# Patient Record
Sex: MALE | Race: WHITE | NOT HISPANIC OR LATINO | Employment: UNEMPLOYED | URBAN - NONMETROPOLITAN AREA
[De-identification: names, ages, dates, MRNs, and addresses within clinical notes are randomized per-mention and may not be internally consistent; named-entity substitution may affect disease eponyms.]

---

## 2017-02-10 ENCOUNTER — OFFICE VISIT (OUTPATIENT)
Dept: FAMILY MEDICINE CLINIC | Facility: CLINIC | Age: 57
End: 2017-02-10

## 2017-02-10 VITALS
DIASTOLIC BLOOD PRESSURE: 82 MMHG | BODY MASS INDEX: 28.99 KG/M2 | HEART RATE: 82 BPM | WEIGHT: 174 LBS | OXYGEN SATURATION: 94 % | SYSTOLIC BLOOD PRESSURE: 153 MMHG | HEIGHT: 65 IN

## 2017-02-10 DIAGNOSIS — R03.0 ELEVATED BLOOD PRESSURE READING WITHOUT DIAGNOSIS OF HYPERTENSION: ICD-10-CM

## 2017-02-10 DIAGNOSIS — B20 HIV (HUMAN IMMUNODEFICIENCY VIRUS INFECTION) (HCC): Primary | ICD-10-CM

## 2017-02-10 DIAGNOSIS — N28.89 RENAL MASS: ICD-10-CM

## 2017-02-10 DIAGNOSIS — B18.1 CHRONIC VIRAL HEPATITIS B WITHOUT DELTA AGENT AND WITHOUT COMA (HCC): ICD-10-CM

## 2017-02-10 DIAGNOSIS — R39.12 POOR URINARY STREAM: ICD-10-CM

## 2017-02-10 DIAGNOSIS — K76.9 LIVER DISEASE: ICD-10-CM

## 2017-02-10 DIAGNOSIS — G04.90 ENCEPHALITIS: ICD-10-CM

## 2017-02-10 PROBLEM — B19.10 HEPATITIS B: Status: ACTIVE | Noted: 2017-02-10

## 2017-02-10 PROCEDURE — 99204 OFFICE O/P NEW MOD 45 MIN: CPT | Performed by: FAMILY MEDICINE

## 2017-02-13 LAB
ALBUMIN SERPL-MCNC: 4.6 G/DL (ref 3.5–5)
ALBUMIN/GLOB SERPL: 1.4 G/DL (ref 1.5–2.5)
ALP SERPL-CCNC: 78 U/L (ref 46–116)
ALT SERPL W P-5'-P-CCNC: 20 U/L (ref 10–44)
ANION GAP SERPL CALCULATED.3IONS-SCNC: 1.9 MMOL/L (ref 3.6–11.2)
AST SERPL-CCNC: 22 U/L (ref 10–34)
BASOPHILS # BLD AUTO: 0.02 10*3/MM3 (ref 0–0.3)
BASOPHILS NFR BLD AUTO: 0.2 % (ref 0–2)
BILIRUB SERPL-MCNC: 0.6 MG/DL (ref 0.2–1.8)
BUN BLD-MCNC: 14 MG/DL (ref 7–21)
BUN/CREAT SERPL: 14.7 (ref 7–25)
CALCIUM SPEC-SCNC: 9.9 MG/DL (ref 7.7–10)
CHLORIDE SERPL-SCNC: 109 MMOL/L (ref 99–112)
CO2 SERPL-SCNC: 34.1 MMOL/L (ref 24.3–31.9)
CREAT BLD-MCNC: 0.95 MG/DL (ref 0.43–1.29)
DEPRECATED RDW RBC AUTO: 50 FL (ref 37–54)
EOSINOPHIL # BLD AUTO: 0.23 10*3/MM3 (ref 0–0.7)
EOSINOPHIL NFR BLD AUTO: 2.9 % (ref 0–5)
ERYTHROCYTE [DISTWIDTH] IN BLOOD BY AUTOMATED COUNT: 13.2 % (ref 11.5–14.5)
GFR SERPL CREATININE-BSD FRML MDRD: 83 ML/MIN/1.73
GLOBULIN UR ELPH-MCNC: 3.3 GM/DL
GLUCOSE BLD-MCNC: 91 MG/DL (ref 70–110)
HCT VFR BLD AUTO: 48.2 % (ref 42–52)
HGB BLD-MCNC: 16.2 G/DL (ref 14–18)
IMM GRANULOCYTES # BLD: 0.01 10*3/MM3 (ref 0–0.03)
IMM GRANULOCYTES NFR BLD: 0.1 % (ref 0–0.5)
LYMPHOCYTES # BLD AUTO: 1.65 10*3/MM3 (ref 1–3)
LYMPHOCYTES NFR BLD AUTO: 20.5 % (ref 21–51)
MCH RBC QN AUTO: 34.5 PG (ref 27–33)
MCHC RBC AUTO-ENTMCNC: 33.6 G/DL (ref 33–37)
MCV RBC AUTO: 102.8 FL (ref 80–94)
MONOCYTES # BLD AUTO: 0.69 10*3/MM3 (ref 0.1–0.9)
MONOCYTES NFR BLD AUTO: 8.6 % (ref 0–10)
NEUTROPHILS # BLD AUTO: 5.44 10*3/MM3 (ref 1.4–6.5)
NEUTROPHILS NFR BLD AUTO: 67.7 % (ref 30–70)
OSMOLALITY SERPL CALC.SUM OF ELEC: 288.8 MOSM/KG (ref 273–305)
PLATELET # BLD AUTO: 164 10*3/MM3 (ref 130–400)
PMV BLD AUTO: 9.8 FL (ref 6–10)
POTASSIUM BLD-SCNC: 4 MMOL/L (ref 3.5–5.3)
PROT SERPL-MCNC: 7.9 G/DL (ref 6–8)
PSA SERPL-MCNC: 0.45 NG/ML (ref 0–4)
RBC # BLD AUTO: 4.69 10*6/MM3 (ref 4.7–6.1)
SODIUM BLD-SCNC: 145 MMOL/L (ref 135–153)
WBC NRBC COR # BLD: 8.04 10*3/MM3 (ref 4.5–12.5)

## 2017-02-13 PROCEDURE — 84153 ASSAY OF PSA TOTAL: CPT | Performed by: FAMILY MEDICINE

## 2017-02-13 PROCEDURE — 85025 COMPLETE CBC W/AUTO DIFF WBC: CPT | Performed by: FAMILY MEDICINE

## 2017-02-13 PROCEDURE — 80053 COMPREHEN METABOLIC PANEL: CPT | Performed by: FAMILY MEDICINE

## 2017-02-13 PROCEDURE — 80061 LIPID PANEL: CPT | Performed by: FAMILY MEDICINE

## 2017-02-13 PROCEDURE — 36415 COLL VENOUS BLD VENIPUNCTURE: CPT | Performed by: FAMILY MEDICINE

## 2017-02-13 PROCEDURE — 83704 LIPOPROTEIN BLD QUAN PART: CPT | Performed by: FAMILY MEDICINE

## 2017-02-15 LAB
CHOLEST SERPL-MCNC: 184 MG/DL (ref 100–199)
HDL SERPL-SCNC: 28.5 UMOL/L
HDLC SERPL-MCNC: 38 MG/DL
LDL-P: 1808 NMOL/L
LDLC REAL SIZE PAT SERPL: 20.9 NM
LDLC SERPL CALC-MCNC: 128 MG/DL (ref 0–99)
SMALL LDL-P: 917 NMOL/L
TRIGL SERPL-MCNC: 89 MG/DL (ref 0–149)

## 2017-02-20 PROBLEM — G04.90 ENCEPHALITIS: Status: ACTIVE | Noted: 2017-02-20

## 2017-02-21 NOTE — PROGRESS NOTES
"Noe Feliciano     VITALS: Blood pressure 153/82, pulse 82, height 65\" (165.1 cm), weight 174 lb (78.9 kg), SpO2 94 %.    Subjective  Chief Complaint:   Chief Complaint   Patient presents with   • Establish Care        History of Present Illness:  Patient is a 56 y.o.  male with a medical history significant for HIV, Hepatitis B, and encephalitis who presents to clinic secondary to establishment of care. No new or acute concerns. Doing okay. He mainly sees Dr. Hudson of  [facility]. He has been seeing her since 2002. He is a poor historian and is unable to give me any history of previous treatments, CD4 counts, or viral counts. His mother used to take him to his physician appointments, but she has recently passed. His father now accompanies him, and he is unsure also of patient's previous treatment history.     Patient does have a history of HIV and follows with  [facility]. He is currently on prezista 800 mg orally daily along with genvoya orally daily. He is uncertain how his viral loads have been. He is uncertain how many vaccines he has received. He does know that he has received a flu shot this past year. He has also had a colonscopy at  - unsure of when.     Per chart review, there is an order for a CT scan from Dr. Hudson secondary to a renal mass. Patient and patient's father are unaware of the history regarding this order.     The following portions of the patient's history were reviewed and updated as appropriate: allergies, current medications, past family history, past medical history, past social history, past surgical history and problem list.    Past Medical History  Past Medical History   Diagnosis Date   • Headache    • Hepatitis B    • HIV (human immunodeficiency virus infection)    • Infectious viral hepatitis    • Renal mass    • Tobacco abuse        Review of Systems  Constitutional: Denies any recent history of HAs, dizziness, fevers, chills, itching.  Eyes: Denies any " changes in vision. Denies any blurry vision or diplopia.  Ears, Nose, Mouth, Throat: Denies any sore throat, rhinorrhea, or cough.  Cardiovascular: Denies any chest pain, pressure, or palpitations.  Respiratory: Denies any shortness of breath or wheezing.  Gastrointestinal: Denies any abdominal pain, nausea, vomiting, diarrhea, or constipation.  Genitourinary: Denies any changes in urination.  Musculoskeletal: Denies any muscle weakness.  Skin and/or breasts: Denies any rashes.  Neurological: Denies any changes in balance or gait.  Psychiatric: Denies any anxiety, depression, or insomnia. Denies any suicidal or homicidal ideations.  Endocrine: Denies any heat or cold intolerance. Denies any voice changes, polydipsia, or polyuria.  Hematologic/Lymphatic: Denies any anemia or easy bruising.    Surgical History  Past Surgical History   Procedure Laterality Date   • Knee surgery Right 1988     Meniscus   • Tonsillectomy     • Cholecystectomy  2013       Family History  Family History   Problem Relation Age of Onset   • Heart disease Mother    • Hypertension Father    • Asthma Father    • COPD Father    • Cancer Paternal Uncle    • Heart disease Maternal Grandmother    • Macular degeneration Maternal Grandmother    • Heart disease Maternal Grandfather    • Macular degeneration Maternal Grandfather        Social History  Social History     Social History   • Marital status: Single     Spouse name: N/A   • Number of children: N/A   • Years of education: N/A     Occupational History   • Not on file.     Social History Main Topics   • Smoking status: Current Every Day Smoker     Packs/day: 2.00     Types: Cigarettes   • Smokeless tobacco: Never Used   • Alcohol use No   • Drug use: No   • Sexual activity: Defer     Other Topics Concern   • Not on file     Social History Narrative       Objective  Physical Exam  Gen: Patient in NAD. Pleasant and answers appropriately. Poor historian secondary to encephalitis.    Skin: Warm  and dry with normal turgor. No purpura, rashes, or unusual pigmentation noted. Hair is normal in appearance and distribution.    HEENT: NC/AT. No lesions noted. Conjunctiva clear, sclera nonicteric. PERRL. EOMI without nystagmus or strabismus. Fundi appear benign. No hemorrhages or exudates of eyes. Auditory canals are patent bilaterally without lesions. TMs intact, nonerythematous, nonbulging without lesions. Nasal mucosa pink, nonerythematous, and nonedematous. O/P nonerythematous and moist without exudate.    Neck: Supple without lymph nodes palpated. FROM. No evidence of tracheal deviation or thyromegaly. Carotid pulses 2+/4 B/L without bruits.     Lungs: CTA B/L without rales, rhonchi, crackles, or wheezes.    Heart: RRR. S1 and S2 normal. No S3 or S4. No MRGT.    Abd: Soft, nontender,nondistended. (+)BSx4 quadrants.  No HSM, masses, or bruits noted.    Extrem: No CCE. Radial pulses 2+/4 and equal B/L. FROMx4. No bone, joint, or muscle tenderness noted.    Neuro: CNs II-XII grossly intact. Speech, memory, and expression decreased. Muscle strength 4/5. DTRs 2+/4 and equal in both UE and LE B/L. No muscle atrophy or involuntary movement noted. No new focal motor/sensory deficits.    Procedures    Assessment/Plan  Noe Feliciano is a 56 y.o. here for establishment of care.  Diagnoses and all orders for this visit:    1) HIV  Will have to get records from Dr. Hudson at  to see what has been going on as patient and patient's father are very poor historians. Continue prezista 800 mg orally daily along with genvoya orally daily. Will need to see how the CD4 counts and viral loads have been; will have to make sure patient is up to date on all vaccines.     2) Chronic viral hepatitis B without delta agent and without coma  -     CBC & Differential  -     Comprehensive Metabolic Panel  -     NMR LipoProfile  -     CBC Auto Differential  -     Osmolality, Calculated; Future  -     Osmolality, Calculated  Uncertain status  again. Will get old records. Check CBC, CMP, and lipids today.    3) Poor urinary stream   -     PSA  Check PSA today.    4) Liver disease   -     NMR LipoProfile  Check lipids and CMP today.    5) Encephalitis  Stable. Continue to monitor.    6) Elevated blood pressures without diagnosis of hypertension  Will need to monitor. May need medications.    7) Renal mass?  Discussed with patient and patient's father to schedule. Will need to get old records.    8) Preventative Medicine.  Recent benign colonoscopy - pending records. Check PSA. Flu shot 2016. Will need to see what other vaccines he needs based on CD4 counts.     Findings and plans discussed with patient who verbalizes understanding and agreement. Will followup with patient once results are in. Patient to followup at clinic PRN or in three months for medical followup.    Florencia Hernandez MD

## 2018-10-23 ENCOUNTER — TRANSCRIBE ORDERS (OUTPATIENT)
Dept: ADMINISTRATIVE | Facility: HOSPITAL | Age: 58
End: 2018-10-23

## 2018-10-23 DIAGNOSIS — B19.10 HEPATITIS B INFECTION WITHOUT DELTA AGENT WITHOUT HEPATIC COMA, UNSPECIFIED CHRONICITY: Primary | ICD-10-CM

## 2018-11-07 ENCOUNTER — HOSPITAL ENCOUNTER (OUTPATIENT)
Dept: ULTRASOUND IMAGING | Facility: HOSPITAL | Age: 58
Discharge: HOME | End: 2018-11-07
Admitting: INTERNAL MEDICINE

## 2018-11-07 DIAGNOSIS — B19.10 HEPATITIS B INFECTION WITHOUT DELTA AGENT WITHOUT HEPATIC COMA, UNSPECIFIED CHRONICITY: ICD-10-CM

## 2018-11-07 PROCEDURE — 76705 ECHO EXAM OF ABDOMEN: CPT | Performed by: RADIOLOGY

## 2018-11-07 PROCEDURE — 76705 ECHO EXAM OF ABDOMEN: CPT

## 2018-11-20 ENCOUNTER — TRANSCRIBE ORDERS (OUTPATIENT)
Dept: ADMINISTRATIVE | Facility: HOSPITAL | Age: 58
End: 2018-11-20

## 2018-11-20 DIAGNOSIS — N39.0 URINARY TRACT INFECTION WITHOUT HEMATURIA, SITE UNSPECIFIED: Primary | ICD-10-CM

## 2018-11-29 ENCOUNTER — HOSPITAL ENCOUNTER (OUTPATIENT)
Dept: ULTRASOUND IMAGING | Facility: HOSPITAL | Age: 58
Discharge: HOME | End: 2018-11-29
Admitting: PHYSICIAN ASSISTANT

## 2018-11-29 DIAGNOSIS — N39.0 URINARY TRACT INFECTION WITHOUT HEMATURIA, SITE UNSPECIFIED: ICD-10-CM

## 2018-11-29 PROCEDURE — 76775 US EXAM ABDO BACK WALL LIM: CPT

## 2018-11-29 PROCEDURE — 76775 US EXAM ABDO BACK WALL LIM: CPT | Performed by: RADIOLOGY

## 2019-02-07 ENCOUNTER — OFFICE VISIT (OUTPATIENT)
Dept: UROLOGY | Facility: CLINIC | Age: 59
End: 2019-02-07

## 2019-02-07 VITALS — HEIGHT: 63 IN | WEIGHT: 155 LBS | BODY MASS INDEX: 27.46 KG/M2

## 2019-02-07 DIAGNOSIS — N42.9 DISORDER OF PROSTATE: ICD-10-CM

## 2019-02-07 DIAGNOSIS — R31.0 GROSS HEMATURIA: Primary | ICD-10-CM

## 2019-02-07 LAB — PSA SERPL-MCNC: 0.32 NG/ML (ref 0–4)

## 2019-02-07 PROCEDURE — 99203 OFFICE O/P NEW LOW 30 MIN: CPT | Performed by: UROLOGY

## 2019-02-07 PROCEDURE — 36415 COLL VENOUS BLD VENIPUNCTURE: CPT | Performed by: UROLOGY

## 2019-02-07 PROCEDURE — 84153 ASSAY OF PSA TOTAL: CPT | Performed by: UROLOGY

## 2019-02-07 RX ORDER — LEVOFLOXACIN 500 MG/1
500 TABLET, FILM COATED ORAL DAILY
Status: ON HOLD | COMMUNITY
End: 2020-01-08

## 2019-02-07 NOTE — PROGRESS NOTES
Chief Complaint:          Renal lesion on ultrasound    HPI:   58 y.o. male.  Pt had some microscopic hematuria.  Pt had a renal ultrasound showed abnormal echogenicity in the midpole of the right kidney that measured 3.5 cm in size.  He also had bilateral cortical cysts.  HPI    His psa in 2017 was 0.45  His creat was 0.95 in 2017  Past Medical History:        Past Medical History:   Diagnosis Date   • Headache    • Hepatitis B    • HIV (human immunodeficiency virus infection) (CMS/HCC)    • Infectious viral hepatitis    • Renal mass    • Tobacco abuse          Current Meds:     Current Outpatient Medications   Medication Sig Dispense Refill   • darunavir ethanolate (PREZISTA) 800 MG tablet tablet Take 800 mg by mouth Daily With Breakfast.     • Gqoscbc-Dqetf-Dlnlcvse-TenofAF (GENVOYA) [phone] MG tablet Take  by mouth.     • levoFLOXacin (LEVAQUIN) 500 MG tablet Take 500 mg by mouth Daily.     • Multiple Vitamin (MULTI-DAY VITAMINS PO) Take  by mouth.       No current facility-administered medications for this visit.         Allergies:      Allergies   Allergen Reactions   • Amoxicillin    • Penicillins         Past Surgical History:     Past Surgical History:   Procedure Laterality Date   • CHOLECYSTECTOMY  2013   • KNEE SURGERY Right 1988    Meniscus   • TONSILLECTOMY           Social History:     Social History     Socioeconomic History   • Marital status: Single     Spouse name: Not on file   • Number of children: Not on file   • Years of education: Not on file   • Highest education level: Not on file   Social Needs   • Financial resource strain: Not on file   • Food insecurity - worry: Not on file   • Food insecurity - inability: Not on file   • Transportation needs - medical: Not on file   • Transportation needs - non-medical: Not on file   Occupational History   • Not on file   Tobacco Use   • Smoking status: Current Every Day Smoker     Packs/day: 2.00     Types: Cigarettes   • Smokeless tobacco:  Never Used   Substance and Sexual Activity   • Alcohol use: No   • Drug use: No   • Sexual activity: Defer   Other Topics Concern   • Not on file   Social History Narrative   • Not on file       Family History:     Family History   Problem Relation Age of Onset   • Heart disease Mother    • Hypertension Father    • Asthma Father    • COPD Father    • Cancer Paternal Uncle    • Heart disease Maternal Grandmother    • Macular degeneration Maternal Grandmother    • Heart disease Maternal Grandfather    • Macular degeneration Maternal Grandfather        Review of Systems:     Review of Systems   Constitutional: Negative for fatigue.   HENT: Negative for sinus pressure and sinus pain.    Eyes: Negative for pain and redness.   Respiratory: Negative for chest tightness.    Cardiovascular: Negative for chest pain.   Gastrointestinal: Positive for abdominal pain and diarrhea. Negative for constipation.   Endocrine: Negative for heat intolerance.   Genitourinary: Positive for hematuria. Negative for frequency.   Musculoskeletal: Negative for back pain.   Allergic/Immunologic: Negative for food allergies.   Neurological: Negative for headaches.   Hematological: Does not bruise/bleed easily.   Psychiatric/Behavioral: The patient is not nervous/anxious.              I have reviewed the follow portions of the patient's history and confirmed they are accurate today:  allergies, current medications, past family history, past medical history, past social history, past surgical history, problem list and ROS  Physical Exam:     Physical Exam   Constitutional: He is oriented to person, place, and time.   HENT:   Head: Normocephalic and atraumatic.   Right Ear: External ear normal.   Left Ear: External ear normal.   Nose: Nose normal.   Mouth/Throat: Oropharynx is clear and moist.   Eyes: Conjunctivae and EOM are normal. Pupils are equal, round, and reactive to light.   Neck: Normal range of motion. Neck supple. No thyromegaly present.  "  Cardiovascular: Normal rate, regular rhythm, normal heart sounds and intact distal pulses.   No murmur heard.  Pulmonary/Chest: Effort normal and breath sounds normal. No respiratory distress. He has no wheezes. He has no rales. He exhibits no tenderness.   Abdominal: Soft. Bowel sounds are normal. He exhibits no distension and no mass. There is no tenderness. No hernia.   Genitourinary: Rectum normal, prostate normal and penis normal.   Genitourinary Comments: No nodules prostate normal for age   Musculoskeletal: Normal range of motion. He exhibits no edema or tenderness.   Lymphadenopathy:     He has no cervical adenopathy.   Neurological: He is alert and oriented to person, place, and time. No cranial nerve deficit. He exhibits normal muscle tone. Coordination normal.   Skin: Skin is warm. No rash noted.   Psychiatric: He has a normal mood and affect. His behavior is normal. Judgment and thought content normal.   Nursing note and vitals reviewed.      Ht 160 cm (63\")   Wt 70.3 kg (155 lb)   BMI 27.46 kg/m²    Procedure:         Assessment:     Encounter Diagnoses   Name Primary?   • Gross hematuria Yes   • Disorder of prostate      Orders Placed This Encounter   Procedures   • CT Abdomen Pelvis With & Without Contrast     Standing Status:   Future     Standing Expiration Date:   2/7/2020     Scheduling Instructions:      No oral contrast, do renal mass protocol     Order Specific Question:   Will Oral Contrast be needed for this procedure?     Answer:   No   • PSA DIAGNOSTIC     Standing Status:   Future     Standing Expiration Date:   2/7/2022       Plan:   Will check a psa this year.  Will order ct scan renal mass protocol and will do cystoscopy    Patient's Body mass index is 27.46 kg/m². BMI is within normal parameters. No follow-up required..      Counseling was given to patient and caretaker for the following topics impressions as follows: gross hematuria and renal lesion on ultrasound. The interim " medical history and current results were reviewed.  A treatment plan with follow-up was made for Gross hematuria [R31.0].  This document has been electronically signed by Shakeel Wagoner MD February 7, 2019 3:00 PM

## 2019-03-12 ENCOUNTER — APPOINTMENT (OUTPATIENT)
Dept: CT IMAGING | Facility: HOSPITAL | Age: 59
End: 2019-03-12

## 2019-03-15 ENCOUNTER — HOSPITAL ENCOUNTER (OUTPATIENT)
Dept: CT IMAGING | Facility: HOSPITAL | Age: 59
Discharge: HOME | End: 2019-03-15
Admitting: UROLOGY

## 2019-03-15 DIAGNOSIS — R31.0 GROSS HEMATURIA: ICD-10-CM

## 2019-03-15 PROCEDURE — 74178 CT ABD&PLV WO CNTR FLWD CNTR: CPT | Performed by: RADIOLOGY

## 2019-03-15 PROCEDURE — 74178 CT ABD&PLV WO CNTR FLWD CNTR: CPT

## 2019-03-15 PROCEDURE — 25010000002 IOPAMIDOL 61 % SOLUTION: Performed by: UROLOGY

## 2019-03-15 RX ADMIN — IOPAMIDOL 100 ML: 612 INJECTION, SOLUTION INTRAVENOUS at 14:50

## 2019-04-05 ENCOUNTER — PROCEDURE VISIT (OUTPATIENT)
Dept: UROLOGY | Facility: CLINIC | Age: 59
End: 2019-04-05

## 2019-04-05 VITALS — HEIGHT: 63 IN | BODY MASS INDEX: 27.46 KG/M2 | WEIGHT: 155 LBS

## 2019-04-05 DIAGNOSIS — N40.1 BENIGN PROSTATIC HYPERPLASIA WITH WEAK URINARY STREAM: ICD-10-CM

## 2019-04-05 DIAGNOSIS — R35.0 FREQUENCY OF MICTURITION: Primary | ICD-10-CM

## 2019-04-05 DIAGNOSIS — R31.0 GROSS HEMATURIA: ICD-10-CM

## 2019-04-05 DIAGNOSIS — R39.12 BENIGN PROSTATIC HYPERPLASIA WITH WEAK URINARY STREAM: ICD-10-CM

## 2019-04-05 LAB
BILIRUB BLD-MCNC: NEGATIVE MG/DL
CLARITY, POC: CLEAR
COLOR UR: YELLOW
GLUCOSE UR STRIP-MCNC: NEGATIVE MG/DL
KETONES UR QL: NEGATIVE
LEUKOCYTE EST, POC: NEGATIVE
NITRITE UR-MCNC: NEGATIVE MG/ML
PH UR: 6 [PH] (ref 5–8)
PROT UR STRIP-MCNC: NEGATIVE MG/DL
RBC # UR STRIP: NEGATIVE /UL
SP GR UR: 1.02 (ref 1–1.03)
UROBILINOGEN UR QL: NORMAL

## 2019-04-05 PROCEDURE — 52000 CYSTOURETHROSCOPY: CPT | Performed by: UROLOGY

## 2019-04-05 PROCEDURE — 81003 URINALYSIS AUTO W/O SCOPE: CPT | Performed by: UROLOGY

## 2019-04-05 NOTE — PROGRESS NOTES
Chief Complaint:         Gross hematuria    HPI:   58 y.o. male.  The patient had a CT scan that showed benign renal cysts with no solid masses and the radiologist stated that the renal ultrasound had an artifact.  HPI      Past Medical History:        Past Medical History:   Diagnosis Date   • Headache    • Hepatitis B    • HIV (human immunodeficiency virus infection) (CMS/HCC)    • Infectious viral hepatitis    • Renal mass    • Tobacco abuse          Current Meds:     Current Outpatient Medications   Medication Sig Dispense Refill   • darunavir ethanolate (PREZISTA) 800 MG tablet tablet Take 800 mg by mouth Daily With Breakfast.     • Gfsyrfv-Okbwc-Nzxclrdr-TenofAF (GENVOYA) [phone] MG tablet Take  by mouth.     • Multiple Vitamin (MULTI-DAY VITAMINS PO) Take  by mouth.     • levoFLOXacin (LEVAQUIN) 500 MG tablet Take 500 mg by mouth Daily.       No current facility-administered medications for this visit.         Allergies:      Allergies   Allergen Reactions   • Amoxicillin    • Penicillins         Past Surgical History:     Past Surgical History:   Procedure Laterality Date   • CHOLECYSTECTOMY  2013   • KNEE SURGERY Right 1988    Meniscus   • TONSILLECTOMY           Social History:     Social History     Socioeconomic History   • Marital status: Single     Spouse name: Not on file   • Number of children: Not on file   • Years of education: Not on file   • Highest education level: Not on file   Tobacco Use   • Smoking status: Current Every Day Smoker     Packs/day: 2.00     Types: Cigarettes   • Smokeless tobacco: Never Used   Substance and Sexual Activity   • Alcohol use: No   • Drug use: No   • Sexual activity: Defer       Family History:     Family History   Problem Relation Age of Onset   • Heart disease Mother    • Hypertension Father    • Asthma Father    • COPD Father    • Cancer Paternal Uncle    • Heart disease Maternal Grandmother    • Macular degeneration Maternal Grandmother    • Heart  "disease Maternal Grandfather    • Macular degeneration Maternal Grandfather        Review of Systems:     Review of Systems      Physical Exam:     Physical Exam    Ht 160 cm (63\")   Wt 70.3 kg (155 lb)   BMI 27.46 kg/m²    Procedure:   Flexible cystoscopy was performed and the patient had a normal urethra and prostatic urethra was normal for age the patient had no bladder tumors or stones seen in normal ureteral orifice ease    Assessment:     Encounter Diagnoses   Name Primary?   • Frequency of micturition Yes   • Gross hematuria      Orders Placed This Encounter   Procedures   • POC Urinalysis Dipstick, Automated       Plan:   The patient CT scan and cystoscopy were negative and his PSA was 0.32.  See him back in a year with a PSA prior    This document has been electronically signed by Shakeel Wagoner MD April 5, 2019 4:03 PM  "

## 2020-01-07 ENCOUNTER — HOSPITAL ENCOUNTER (INPATIENT)
Facility: HOSPITAL | Age: 60
LOS: 10 days | Discharge: HOME | End: 2020-01-17
Attending: PSYCHIATRY & NEUROLOGY | Admitting: PSYCHIATRY & NEUROLOGY

## 2020-01-07 ENCOUNTER — APPOINTMENT (OUTPATIENT)
Dept: CT IMAGING | Facility: HOSPITAL | Age: 60
End: 2020-01-07

## 2020-01-07 ENCOUNTER — HOSPITAL ENCOUNTER (EMERGENCY)
Facility: HOSPITAL | Age: 60
Discharge: PSYCHIATRIC OR SUBSTANCE USE FACILITY | End: 2020-01-07
Attending: EMERGENCY MEDICINE | Admitting: EMERGENCY MEDICINE

## 2020-01-07 VITALS
BODY MASS INDEX: 26.66 KG/M2 | HEIGHT: 65 IN | DIASTOLIC BLOOD PRESSURE: 76 MMHG | RESPIRATION RATE: 18 BRPM | HEART RATE: 82 BPM | OXYGEN SATURATION: 99 % | WEIGHT: 160 LBS | TEMPERATURE: 97.8 F | SYSTOLIC BLOOD PRESSURE: 142 MMHG

## 2020-01-07 DIAGNOSIS — F03.91 DEMENTIA WITH BEHAVIORAL DISTURBANCE, UNSPECIFIED DEMENTIA TYPE: Primary | ICD-10-CM

## 2020-01-07 DIAGNOSIS — N39.0 ACUTE LOWER UTI: ICD-10-CM

## 2020-01-07 PROBLEM — F29 PSYCHOSIS (HCC): Status: ACTIVE | Noted: 2020-01-07

## 2020-01-07 LAB
6-ACETYL MORPHINE: NEGATIVE
ALBUMIN SERPL-MCNC: 4.2 G/DL (ref 3.5–5.2)
ALBUMIN/GLOB SERPL: 1.2 G/DL
ALP SERPL-CCNC: 75 U/L (ref 39–117)
ALT SERPL W P-5'-P-CCNC: 16 U/L (ref 1–41)
AMPHET+METHAMPHET UR QL: NEGATIVE
ANION GAP SERPL CALCULATED.3IONS-SCNC: 9.4 MMOL/L (ref 5–15)
AST SERPL-CCNC: 19 U/L (ref 1–40)
BACTERIA UR QL AUTO: ABNORMAL /HPF
BARBITURATES UR QL SCN: NEGATIVE
BASOPHILS # BLD AUTO: 0.02 10*3/MM3 (ref 0–0.2)
BASOPHILS NFR BLD AUTO: 0.3 % (ref 0–1.5)
BENZODIAZ UR QL SCN: NEGATIVE
BILIRUB SERPL-MCNC: 0.3 MG/DL (ref 0.2–1.2)
BILIRUB UR QL STRIP: NEGATIVE
BUN BLD-MCNC: 24 MG/DL (ref 6–20)
BUN/CREAT SERPL: 23.3 (ref 7–25)
BUPRENORPHINE SERPL-MCNC: NEGATIVE NG/ML
CALCIUM SPEC-SCNC: 9.7 MG/DL (ref 8.6–10.5)
CANNABINOIDS SERPL QL: NEGATIVE
CHLORIDE SERPL-SCNC: 100 MMOL/L (ref 98–107)
CLARITY UR: ABNORMAL
CO2 SERPL-SCNC: 30.6 MMOL/L (ref 22–29)
COCAINE UR QL: NEGATIVE
COLOR UR: YELLOW
CREAT BLD-MCNC: 1.03 MG/DL (ref 0.76–1.27)
DEPRECATED RDW RBC AUTO: 50.1 FL (ref 37–54)
EOSINOPHIL # BLD AUTO: 0.14 10*3/MM3 (ref 0–0.4)
EOSINOPHIL NFR BLD AUTO: 2.1 % (ref 0.3–6.2)
ERYTHROCYTE [DISTWIDTH] IN BLOOD BY AUTOMATED COUNT: 13.1 % (ref 12.3–15.4)
ETHANOL BLD-MCNC: <10 MG/DL (ref 0–10)
ETHANOL UR QL: <0.01 %
GFR SERPL CREATININE-BSD FRML MDRD: 74 ML/MIN/1.73
GLOBULIN UR ELPH-MCNC: 3.6 GM/DL
GLUCOSE BLD-MCNC: 68 MG/DL (ref 65–99)
GLUCOSE UR STRIP-MCNC: NEGATIVE MG/DL
HCT VFR BLD AUTO: 45.8 % (ref 37.5–51)
HGB BLD-MCNC: 15.6 G/DL (ref 13–17.7)
HGB UR QL STRIP.AUTO: ABNORMAL
HIV1+2 AB SER QL: REACTIVE
HYALINE CASTS UR QL AUTO: ABNORMAL /LPF
IMM GRANULOCYTES # BLD AUTO: 0.02 10*3/MM3 (ref 0–0.05)
IMM GRANULOCYTES NFR BLD AUTO: 0.3 % (ref 0–0.5)
KETONES UR QL STRIP: NEGATIVE
LEUKOCYTE ESTERASE UR QL STRIP.AUTO: ABNORMAL
LYMPHOCYTES # BLD AUTO: 1.48 10*3/MM3 (ref 0.7–3.1)
LYMPHOCYTES NFR BLD AUTO: 22.7 % (ref 19.6–45.3)
MAGNESIUM SERPL-MCNC: 2.4 MG/DL (ref 1.6–2.6)
MCH RBC QN AUTO: 35.3 PG (ref 26.6–33)
MCHC RBC AUTO-ENTMCNC: 34.1 G/DL (ref 31.5–35.7)
MCV RBC AUTO: 103.6 FL (ref 79–97)
METHADONE UR QL SCN: NEGATIVE
MONOCYTES # BLD AUTO: 0.52 10*3/MM3 (ref 0.1–0.9)
MONOCYTES NFR BLD AUTO: 8 % (ref 5–12)
NEUTROPHILS # BLD AUTO: 4.34 10*3/MM3 (ref 1.7–7)
NEUTROPHILS NFR BLD AUTO: 66.6 % (ref 42.7–76)
NITRITE UR QL STRIP: POSITIVE
NRBC BLD AUTO-RTO: 0 /100 WBC (ref 0–0.2)
OPIATES UR QL: NEGATIVE
OXYCODONE UR QL SCN: NEGATIVE
PCP UR QL SCN: NEGATIVE
PH UR STRIP.AUTO: 6.5 [PH] (ref 5–8)
PLATELET # BLD AUTO: 227 10*3/MM3 (ref 140–450)
PMV BLD AUTO: 9 FL (ref 6–12)
POTASSIUM BLD-SCNC: 5 MMOL/L (ref 3.5–5.2)
PROT SERPL-MCNC: 7.8 G/DL (ref 6–8.5)
PROT UR QL STRIP: NEGATIVE
RBC # BLD AUTO: 4.42 10*6/MM3 (ref 4.14–5.8)
RBC # UR: ABNORMAL /HPF
REF LAB TEST METHOD: ABNORMAL
SODIUM BLD-SCNC: 140 MMOL/L (ref 136–145)
SP GR UR STRIP: 1.02 (ref 1–1.03)
SQUAMOUS #/AREA URNS HPF: ABNORMAL /HPF
UROBILINOGEN UR QL STRIP: ABNORMAL
WBC NRBC COR # BLD: 6.52 10*3/MM3 (ref 3.4–10.8)
WBC UR QL AUTO: ABNORMAL /HPF

## 2020-01-07 PROCEDURE — G0432 EIA HIV-1/HIV-2 SCREEN: HCPCS | Performed by: PHYSICIAN ASSISTANT

## 2020-01-07 PROCEDURE — 80307 DRUG TEST PRSMV CHEM ANLYZR: CPT | Performed by: EMERGENCY MEDICINE

## 2020-01-07 PROCEDURE — 86702 HIV-2 ANTIBODY: CPT | Performed by: PHYSICIAN ASSISTANT

## 2020-01-07 PROCEDURE — 86701 HIV-1ANTIBODY: CPT | Performed by: PHYSICIAN ASSISTANT

## 2020-01-07 PROCEDURE — 99285 EMERGENCY DEPT VISIT HI MDM: CPT

## 2020-01-07 PROCEDURE — 85025 COMPLETE CBC W/AUTO DIFF WBC: CPT | Performed by: EMERGENCY MEDICINE

## 2020-01-07 PROCEDURE — 83735 ASSAY OF MAGNESIUM: CPT | Performed by: EMERGENCY MEDICINE

## 2020-01-07 PROCEDURE — 81001 URINALYSIS AUTO W/SCOPE: CPT | Performed by: EMERGENCY MEDICINE

## 2020-01-07 PROCEDURE — 80053 COMPREHEN METABOLIC PANEL: CPT | Performed by: EMERGENCY MEDICINE

## 2020-01-07 PROCEDURE — 70450 CT HEAD/BRAIN W/O DYE: CPT

## 2020-01-07 PROCEDURE — 93005 ELECTROCARDIOGRAM TRACING: CPT | Performed by: PSYCHIATRY & NEUROLOGY

## 2020-01-07 PROCEDURE — 70450 CT HEAD/BRAIN W/O DYE: CPT | Performed by: RADIOLOGY

## 2020-01-07 RX ORDER — ALUMINA, MAGNESIA, AND SIMETHICONE 2400; 2400; 240 MG/30ML; MG/30ML; MG/30ML
15 SUSPENSION ORAL EVERY 6 HOURS PRN
Status: DISCONTINUED | OUTPATIENT
Start: 2020-01-07 | End: 2020-01-17 | Stop reason: HOSPADM

## 2020-01-07 RX ORDER — HYDROXYZINE 50 MG/1
50 TABLET, FILM COATED ORAL EVERY 6 HOURS PRN
Status: DISCONTINUED | OUTPATIENT
Start: 2020-01-07 | End: 2020-01-17 | Stop reason: HOSPADM

## 2020-01-07 RX ORDER — BENZONATATE 100 MG/1
100 CAPSULE ORAL 3 TIMES DAILY PRN
Status: DISCONTINUED | OUTPATIENT
Start: 2020-01-07 | End: 2020-01-17 | Stop reason: HOSPADM

## 2020-01-07 RX ORDER — NITROFURANTOIN 25; 75 MG/1; MG/1
100 CAPSULE ORAL 2 TIMES DAILY
Status: DISPENSED | OUTPATIENT
Start: 2020-01-07 | End: 2020-01-14

## 2020-01-07 RX ORDER — ONDANSETRON 4 MG/1
4 TABLET, FILM COATED ORAL EVERY 6 HOURS PRN
Status: DISCONTINUED | OUTPATIENT
Start: 2020-01-07 | End: 2020-01-17 | Stop reason: HOSPADM

## 2020-01-07 RX ORDER — NITROFURANTOIN 25; 75 MG/1; MG/1
100 CAPSULE ORAL ONCE
Status: DISCONTINUED | OUTPATIENT
Start: 2020-01-07 | End: 2020-01-07 | Stop reason: HOSPADM

## 2020-01-07 RX ORDER — LOPERAMIDE HYDROCHLORIDE 2 MG/1
2 CAPSULE ORAL
Status: DISCONTINUED | OUTPATIENT
Start: 2020-01-07 | End: 2020-01-17 | Stop reason: HOSPADM

## 2020-01-07 RX ORDER — FAMOTIDINE 20 MG/1
20 TABLET, FILM COATED ORAL 2 TIMES DAILY PRN
Status: DISCONTINUED | OUTPATIENT
Start: 2020-01-07 | End: 2020-01-17 | Stop reason: HOSPADM

## 2020-01-07 RX ORDER — IBUPROFEN 400 MG/1
400 TABLET ORAL EVERY 6 HOURS PRN
Status: DISCONTINUED | OUTPATIENT
Start: 2020-01-07 | End: 2020-01-17 | Stop reason: HOSPADM

## 2020-01-07 RX ORDER — ECHINACEA PURPUREA EXTRACT 125 MG
2 TABLET ORAL AS NEEDED
Status: DISCONTINUED | OUTPATIENT
Start: 2020-01-07 | End: 2020-01-17 | Stop reason: HOSPADM

## 2020-01-07 RX ORDER — ACETAMINOPHEN 325 MG/1
650 TABLET ORAL EVERY 6 HOURS PRN
Status: DISCONTINUED | OUTPATIENT
Start: 2020-01-07 | End: 2020-01-17 | Stop reason: HOSPADM

## 2020-01-07 RX ORDER — BENZTROPINE MESYLATE 1 MG/ML
1 INJECTION INTRAMUSCULAR; INTRAVENOUS ONCE AS NEEDED
Status: DISCONTINUED | OUTPATIENT
Start: 2020-01-07 | End: 2020-01-17 | Stop reason: HOSPADM

## 2020-01-07 RX ORDER — BENZTROPINE MESYLATE 1 MG/1
2 TABLET ORAL ONCE AS NEEDED
Status: DISCONTINUED | OUTPATIENT
Start: 2020-01-07 | End: 2020-01-17 | Stop reason: HOSPADM

## 2020-01-07 RX ORDER — TRAZODONE HYDROCHLORIDE 50 MG/1
50 TABLET ORAL NIGHTLY PRN
Status: DISCONTINUED | OUTPATIENT
Start: 2020-01-07 | End: 2020-01-17 | Stop reason: HOSPADM

## 2020-01-07 RX ADMIN — NITROFURANTOIN MONOHYDRATE/MACROCRYSTALLINE 100 MG: 25; 75 CAPSULE ORAL at 20:08

## 2020-01-07 NOTE — ED PROVIDER NOTES
Subjective   60 y/o male comes in with psych evaluation.  When evaluating patient.  He denies any suicidal homicidal ideation.  Patient does state he has history of hepatitis B.  He denies history of HIV infection however it is listed in his past medical history.      History provided by:  Patient   used: No    Mental Health Problem   Presenting symptoms: agitation, bizarre behavior and paranoid behavior    Patient accompanied by:  Family member  Degree of incapacity (severity):  Moderate  Onset quality:  Gradual  Timing:  Intermittent  Progression:  Worsening  Chronicity:  New  Context: not noncompliant and not recent medication change    Treatment compliance:  Untreated  Time since last psychoactive medication taken:  2 days  Relieved by:  Nothing  Worsened by:  Nothing  Ineffective treatments:  None tried  Associated symptoms: no anxiety, no appetite change, no chest pain, no feelings of worthlessness, no headaches and no hyperventilation        Review of Systems   Constitutional: Negative for appetite change.   Cardiovascular: Negative.  Negative for chest pain.   Gastrointestinal: Negative.    Musculoskeletal: Negative.  Negative for back pain, gait problem and joint swelling.   Skin: Negative.  Negative for pallor.   Neurological: Negative for headaches.   Psychiatric/Behavioral: Positive for agitation, behavioral problems, confusion and paranoia. The patient is not nervous/anxious.    All other systems reviewed and are negative.      Past Medical History:   Diagnosis Date   • Dementia (CMS/HCC)    • Encephalopathy    • Headache    • Hepatitis B    • HIV (human immunodeficiency virus infection) (CMS/HCC)    • Infectious viral hepatitis    • Renal mass    • Tobacco abuse        Allergies   Allergen Reactions   • Amoxicillin    • Penicillins        Past Surgical History:   Procedure Laterality Date   • CHOLECYSTECTOMY  2013   • KNEE SURGERY Right 1988    Meniscus   • TONSILLECTOMY          Family History   Problem Relation Age of Onset   • Heart disease Mother    • Hypertension Father    • Asthma Father    • COPD Father    • Cancer Paternal Uncle    • Heart disease Maternal Grandmother    • Macular degeneration Maternal Grandmother    • Heart disease Maternal Grandfather    • Macular degeneration Maternal Grandfather        Social History     Socioeconomic History   • Marital status: Single     Spouse name: Not on file   • Number of children: Not on file   • Years of education: Not on file   • Highest education level: Not on file   Tobacco Use   • Smoking status: Current Every Day Smoker     Packs/day: 2.00     Types: Cigarettes   • Smokeless tobacco: Never Used   Substance and Sexual Activity   • Alcohol use: No   • Drug use: No   • Sexual activity: Defer           Objective   Physical Exam   Constitutional: He is oriented to person, place, and time. He appears well-developed and well-nourished. No distress.   HENT:   Head: Normocephalic.   Right Ear: External ear normal.   Left Ear: External ear normal.   Nose: Nose normal.   Mouth/Throat: Oropharynx is clear and moist. No oropharyngeal exudate.   Eyes: Pupils are equal, round, and reactive to light. Conjunctivae and EOM are normal. Right eye exhibits no discharge. Left eye exhibits no discharge. No scleral icterus.   Neck: Normal range of motion. Neck supple. No JVD present. No tracheal deviation present. No thyromegaly present.   Cardiovascular: Normal rate, regular rhythm, normal heart sounds and intact distal pulses. Exam reveals no friction rub.   No murmur heard.  Pulmonary/Chest: Effort normal and breath sounds normal. No stridor. No respiratory distress. He has no wheezes. He has no rales. He exhibits no tenderness.   Abdominal: Soft. Bowel sounds are normal. He exhibits no distension and no mass. There is no tenderness. There is no rebound and no guarding. No hernia.   Musculoskeletal: Normal range of motion. He exhibits no edema,  tenderness or deformity.   Lymphadenopathy:     He has no cervical adenopathy.   Neurological: He is alert and oriented to person, place, and time. He displays normal reflexes. No cranial nerve deficit or sensory deficit. He exhibits normal muscle tone. Coordination normal.   Skin: Skin is warm and dry. Capillary refill takes less than 2 seconds. No rash noted. No erythema. No pallor.   Psychiatric: His mood appears anxious. His speech is rapid and/or pressured. He is slowed. He is inattentive.   Nursing note and vitals reviewed.      Procedures           ED Course  ED Course as of Jan 07 1927   Tue Jan 07, 2020 1922 IMPRESSION:  No acute intracranial pathology. Nothing is seen on this exam to  specifically account for the patient's symptoms.    []   1922 Patient medically cleared for intake. Patient will be admitted.     []      ED Course User Index  [] Nato Adorno PA-C                                               [facility]    Final diagnoses:   Dementia with behavioral disturbance, unspecified dementia type (CMS/HCC)   Acute lower UTI            Nato Adorno PA-C  01/07/20 1927

## 2020-01-07 NOTE — NURSING NOTE
Patient pockets emptied. Search completed with two staff members present.The patient was placed in hospital attire. Items logged and placed in cabinet in intake area.Room was swept for any potential safety hazards room cleared and patient placed in treatment room for evaluation. Will continue to monitor pt status. Waiting for ED provider to clear pt medically. Waiting on Lab results.

## 2020-01-07 NOTE — NURSING NOTE
Intake assessment completed. Pt brought for evaluation by his father and brother. They state that the pt has HIV, dementia, and encephalopathy. Family is concerned that the pt is no longer able to take care of himself, and fear that he may be putting himself in dangerous situations. They state that the pt has not been cleaning his house, or taking care of himself. The family often gets calls that the pt is wandering, or found looking into neighbors windows.  There goal is to get him into a nursing home, however have not found one with any openings. Pt denies SI, HI, or AVH. Pt denies depression or anxiety. He denies substance abuse. Pt disorganized and rambling throughout assessment, which reportedly has been his baseline for some time.

## 2020-01-08 PROBLEM — N39.0 UTI (URINARY TRACT INFECTION): Status: ACTIVE | Noted: 2020-01-08

## 2020-01-08 PROBLEM — Z72.0 TOBACCO ABUSE: Status: ACTIVE | Noted: 2020-01-08

## 2020-01-08 PROBLEM — F17.200 TOBACCO USE DISORDER: Status: ACTIVE | Noted: 2020-01-08

## 2020-01-08 PROCEDURE — 99223 1ST HOSP IP/OBS HIGH 75: CPT | Performed by: PSYCHIATRY & NEUROLOGY

## 2020-01-08 RX ORDER — MULTIVITAMIN
1 TABLET ORAL DAILY
Status: DISCONTINUED | OUTPATIENT
Start: 2020-01-08 | End: 2020-01-17 | Stop reason: HOSPADM

## 2020-01-08 RX ORDER — MULTIVITAMIN
1 TABLET ORAL DAILY
Status: CANCELLED | OUTPATIENT
Start: 2020-01-08

## 2020-01-08 RX ORDER — MULTIVITAMIN
1 TABLET ORAL DAILY
COMMUNITY

## 2020-01-08 RX ADMIN — HYDROXYZINE HYDROCHLORIDE 50 MG: 50 TABLET ORAL at 09:17

## 2020-01-08 RX ADMIN — Medication 1 TABLET: at 17:01

## 2020-01-08 RX ADMIN — NITROFURANTOIN MONOHYDRATE/MACROCRYSTALLINE 100 MG: 25; 75 CAPSULE ORAL at 21:58

## 2020-01-08 RX ADMIN — NITROFURANTOIN MONOHYDRATE/MACROCRYSTALLINE 100 MG: 25; 75 CAPSULE ORAL at 09:17

## 2020-01-08 RX ADMIN — TUBERCULIN PURIFIED PROTEIN DERIVATIVE 5 UNITS: 5 INJECTION, SOLUTION INTRADERMAL at 21:58

## 2020-01-08 RX ADMIN — Medication 800 MG: at 17:01

## 2020-01-08 NOTE — PROGRESS NOTES
[id number]    Therapist spoke with patient's state guardian Lydia Li at [phone] ext [phone] and [phone].  Lydia provided verbal consent for patient to have nursing home referrals sent to [facility], [facility], and [facility] nursing homes.  She reported that patient is accepted to [facility], however she prefers patient attend a nursing home if possible.  Navigator Ayesha Fuentes witnessed.    Lydia discussed that patient has been wandering in the middle of the night and going into the [address].  She reports he has not been taking care of himself or his home.  She discussed he has been diagnosed with Dementia, Hep B, and HIV.  She reports patient's medications to be challenging to placement efforts and that his medicines need to be formulary for nursing home placement.  She stated she was agreeable for nursing home referral to anywhere in the [facility].    She discussed that patient has completed 2 bachelor and 2 master's degrees in business administration.  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  She reports these are not delusions.

## 2020-01-08 NOTE — PLAN OF CARE
Problem: Patient Care Overview  Goal: Plan of Care Review  Outcome: Ongoing (interventions implemented as appropriate)  Flowsheets (Taken 1/8/2020 0652)  Progress: no change  Plan of Care Reviewed With: patient  Patient Agreement with Plan of Care: agrees  Note:   Denies anxiety, depression, hallucinations, paranoia and si/hi. Quiet day.Disoriented to situation.

## 2020-01-08 NOTE — H&P
"INITIAL PSYCHIATRIC HISTORY & PHYSICAL    Patient Identification:  Name:   Noe Feliciano  Age:   59 y.o.  Sex:   male  :   1960  MRN:   [id number]  Visit Number:   [id number]  Primary Care Physician:   Aleksandra Hudson MD    SUBJECTIVE    CC/Focus of Exam: Psychosis    HPI: This is the first [facility] admission for Noe Feliciano who is a 59 y.o. Judaism, college educated, retired from [employer] male who was admitted on 2020 with complaints of not completing his ADLs, wandering around even in the [place], and looking into neighbors windows.  The patient has no memory of this.  The family reported that the patient is not safe in his home, and he is unable to care for himself anymore.  They are requesting nursing home placement for the patient.    The patient is able to state his name and where he is at, but states he does not know why he is here.The patient says the season is spring, but did know that Jose is the .  Then goes on to say, that he has been in [facility] and [facility]. 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  The sister corroborates this.     It is hard to elicit much reliable information from the patient.  He is rambling and mumbling with pressured speech during interview.  He is hard to understand.  Patient jumps from one topic to another.  An example of this is wanting to talk about an episode of the Marcelo Leno show, and then jump to talking about his puppy.  The patient's family had reported that his house was in disarray, but patient reports that \"I am proud of my house and the floors are shiny\".  Patient states the brother pays his bills out of his halfway of $1357 per month.  Also states the family takes care of his medication.  Patient denies any depression or auditory or visual hallucinations. He obtained a double bachelor degrees and a master's degree.  States he was in [place] " "studying international relations.  Then states he came back to the [facility] to work at [employer], \"running the parade\".    Asked the patient about his HIV status, and he states that he did not know he had it.  States he takes a green and a red pill, but only knows they are for infection.    Medical: History of right knee injury in the past, cholecystectomy, tonsillectomy.  HIV and hepatitis B.  Allergic to penicillin.    Available medical/psychiatric records reviewed and incorporated into the current document.     PAST PSYCHIATRIC HX: Patient denies any past psychiatric history.    SUBSTANCE USE HX: Denies any history of alcohol or illicit drug abuse.  Smokes cigarettes 1 pack a day for 25 years.  UDS is negative.       FAMILY HX: Maternal uncle had bipolar disorder.  No suicides among first-degree relatives.    SOCIAL HX: Patient was born in [place], [place].  States his father was in the [employer] and he was raised by his mother [place] [employer].  He describes himself as a \"nerd that always had his nose in a book\".  States he has 2 bachelor's degrees and a masters degree in international relations.  First states that he is single, but later states he  a woman while he was in [place].  States he moved to [place], [place] 9 years ago.  Denies any legal issues.  Patient does not have any children, and no history of  service.    Past Medical History:   Diagnosis Date   • Dementia (CMS/HCC)    • Encephalopathy    • Headache    • Hepatitis B    • HIV (human immunodeficiency virus infection) (CMS/HCC)    • Infectious viral hepatitis    • Renal mass    • Tobacco abuse        Past Surgical History:   Procedure Laterality Date   • CHOLECYSTECTOMY  2013   • KNEE SURGERY Right 1988    Meniscus   • TONSILLECTOMY         Family History   Problem Relation Age of Onset   • Heart disease Mother    • Hypertension Father    • Asthma Father    • COPD Father    • Cancer Paternal Uncle    • Heart disease Maternal " Grandmother    • Macular degeneration Maternal Grandmother    • Heart disease Maternal Grandfather    • Macular degeneration Maternal Grandfather          Medications Prior to Admission   Medication Sig Dispense Refill Last Dose   • darunavir ethanolate (PREZISTA) 800 MG tablet tablet Take 800 mg by mouth Daily With Breakfast.   Taking   • Klmjgsd-Fpuya-Iqnghius-TenofAF (GENVOYA) [phone] MG tablet Take  by mouth.   Taking   • levoFLOXacin (LEVAQUIN) 500 MG tablet Take 500 mg by mouth Daily.   Not Taking   • Multiple Vitamin (MULTI-DAY VITAMINS PO) Take  by mouth.   Taking           ALLERGIES:  Amoxicillin and Penicillins    Temp:  [97.3 °F (36.3 °C)-99.2 °F (37.3 °C)] 99.2 °F (37.3 °C)  Heart Rate:  [72-84] 84  Resp:  [18] 18  BP: (105-142)/(22-80) 137/22    REVIEW OF SYSTEMS:  Review of Systems   Constitutional: Negative.    HENT: Negative.    Eyes: Negative.    Respiratory: Negative.    Cardiovascular: Negative.    Gastrointestinal: Negative.    Endocrine: Negative.    Genitourinary: Negative.    Musculoskeletal: Negative.    Skin: Negative.    Allergic/Immunologic: Negative.    Neurological: Negative.    Hematological: Negative.    Psychiatric/Behavioral: Positive for behavioral problems and confusion. The patient is nervous/anxious.       See HPI for psychiatric ROS  OBJECTIVE    PHYSICAL EXAM:  Physical Exam  Constitutional: disheveled and poor personal hygiene  HENT:   Head: Normocephalic and atraumatic.   Right Ear: External ear normal.   Left Ear: External ear normal.   Mouth/Throat: Oropharynx is clear and moist.   Eyes: Pupils are equal, round, and reactive to light. Conjunctivae and EOM are normal.   Neck: Normal range of motion. Neck supple.   Cardiovascular: Normal rate, regular rhythm and normal heart sounds.    Pulmonary/Chest: Effort normal and breath sounds normal. No respiratory distress. No wheezes.   Abdominal: Soft. Bowel sounds are normal.No distension. There is no tenderness.    Musculoskeletal: Normal range of motion. No edema or deformity.   Neurological:Alert and oriented to person, place, and time. No cranial nerve deficit. Coordination normal.   Skin: Skin is warm and dry. No rash noted. No erythema.     MENTAL STATUS EXAM:               Hygiene:   poor  Cooperation:  Cooperative  Eye Contact:  Fair  Psychomotor Behavior:  Appropriate  Affect:  Blunted  Hopelessness: Denies  Speech:  Pressured, Rambling and Mumbled  Thought Progress:  Tangential  Thought Content:  Bizarre  Suicidal:  None  Homicidal:  None  Hallucinations:  None  Delusion:  None  Memory:  Intact  Orientation:  Person, Place and Time  Reliability:  fair  Insight:  Poor  Judgement:  Poor  Impulse Control:  Poor      Imaging Results (Last 24 Hours)     ** No results found for the last 24 hours. **           ECG/EMG Results (most recent)     Procedure Component Value Units Date/Time    ECG 12 Lead [[id number]] Collected:  01/08/20 0428     Updated:  01/08/20 0433    Narrative:       Test Reason : Baseline Cardiac Status  Blood Pressure : **/** mmHG  Vent. Rate : 078 BPM     Atrial Rate : 078 BPM     P-R Int : 180 ms          QRS Dur : 080 ms      QT Int : 392 ms       P-R-T Axes : 078 003 063 degrees     QTc Int : 446 ms    Normal sinus rhythm  Possible Left atrial enlargement  Borderline ECG  No previous ECGs available    Referred By:  JEFF           Confirmed By:            Lab Results   Component Value Date    GLUCOSE 68 01/07/2020    BUN 24 (H) 01/07/2020    CREATININE 1.03 01/07/2020    EGFRIFNONA 74 01/07/2020    BCR 23.3 01/07/2020    CO2 30.6 (H) 01/07/2020    CALCIUM 9.7 01/07/2020    ALBUMIN 4.20 01/07/2020    AST 19 01/07/2020    ALT 16 01/07/2020       Lab Results   Component Value Date    WBC 6.52 01/07/2020    HGB 15.6 01/07/2020    HCT 45.8 01/07/2020    .6 (H) 01/07/2020     01/07/2020       Pain Management Panel     Pain Management Panel Latest Ref Rng & Units 1/7/2020    AMPHETAMINES  SCREEN, URINE Negative Negative    BARBITURATES SCREEN Negative Negative    BENZODIAZEPINE SCREEN, URINE Negative Negative    BUPRENORPHINEUR Negative Negative    COCAINE SCREEN, URINE Negative Negative    METHADONE SCREEN, URINE Negative Negative          Brief Urine Lab Results  (Last result in the past 365 days)      Color   Clarity   Blood   Leuk Est   Nitrite   Protein   CREAT   Urine HCG        01/07/20 1643 Yellow Cloudy Small (1+) Large (3+) Positive Negative               DATA  Labs reviewed  EKG reviewed  MILAD not done  Records reviewed. The patient has a history of HIV and dementia and has been in treatment for HIV at the [facility].      ASSESSMENT & PLAN:        Psychosis (CMS/HCC)  - Appears to be due to underlying dementia/mild cognitive impairment, will continue to monitor and may start on atypical antipsychotic if patient doesn't improve with supportive care.      HIV (human immunodeficiency virus infection) (CMS/HCC)  - Resume home medications      Tobacco use disorder  - Nicoderm patch      UTI (urinary tract infection)  - Macrobid        The patient has been admitted for safety and stabilization.  Patient will be monitored for suicidality daily and maintained on Sucide precaution Level 3 (q15 min checks) . The patient will have individual and group therapy with a master's level therapist. A master treatment plan will be developed and agreed upon by the patient and his/her treatment team.  The patient's estimated length of stay in the hospital is 5-7 days.       Written by Hossein Fish RN, acting as scribe for Dr. Wiggins. 's signature on this note affirms that the note adequately documents the care provided.     Winsome Fish RN  01/08/20  10:35 AM    I, Oziel Wiggins MD, personally performed the services described in this documentation as scribed by the above named individual in my presence, and it is both accurate and complete.

## 2020-01-08 NOTE — PLAN OF CARE
Problem: Patient Care Overview  Goal: Plan of Care Review  Flowsheets (Taken 1/8/2020 1447)  Consent Given to Review Plan with: State guardian.  Progress: no change  Plan of Care Reviewed With: patient  Patient Agreement with Plan of Care: agrees  Outcome Summary: Completed initial assessment, discussed alternative aftercare resources and expectations of treatment; reviewed treatment plan.     Problem: Patient Care Overview  Goal: Individualization and Mutuality  Flowsheets (Taken 1/8/2020 145)  Patient Personal Strengths: expressive of needs; expressive of emotions; family/social support; motivated for treatment; resourceful  Patient Vulnerabilities: Ineffective coping skills, poor insight.  Patient Specific Goals (Include Timeframe): Patient to identity 3 healthy coping skills, complete crisis safety planning, and deny all SI, HI,and AVH prior to discharge.  Patient Specific Interventions: Patient to access psychiatric evaluation, medication management, individual and group CBT therapy sessions during admission.  What Information Would Help Us Give You More Personalized Care?: None  How Would You and/or Your Support Person Like to Participate in Your Care?: Patient agreeable for state guardian to be involved with treatment.  What Anxieties, Fears, Concerns, or Questions Do You Have About Your Care?: None  Patient Specific Preferences: Mood stabilization.     Problem: Patient Care Overview  Goal: Discharge Needs Assessment  Flowsheets (Taken 1/8/2020 1451)  Outpatient/Agency/Support Group Needs: other (see comments) (Nursing home referral.)  Discharge Coordination/Progress: Patient anticipated to attend nursing home or personal care home upon discharge.  He has insurance for medications.  Transportation Anticipated: public transportation  Anticipated Discharge Disposition: LTCH  Current Discharge Risk: psychiatric illness  Concerns to be Addressed: coping/stress; decision making; discharge planning; mental  health; medication  Readmission Within the Last 30 Days: no previous admission in last 30 days  Patient/Family Anticipated Services at Transition: other (see comments) (Nursing home referral.)  Patient's Choice of Community Agency(s): Patient to have nursing home and personal care referral.  Patient/Family Anticipates Transition to: long term care facility     Problem: Patient Care Overview  Goal: Interprofessional Rounds/Family Conf  Flowsheets (Taken 1/8/2020 1450)  Participants: milieu/psych techs; nursing; social work; psychiatrist  Summary: Treatment team staffing.     Problem: Overarching Goals (Adult)  Goal: Optimized Coping Skills in Response to Life Stressors  Intervention: Promote Effective Coping Strategies  Flowsheets (Taken 1/8/2020 1450)  Supportive Measures: active listening utilized; counseling provided; self-care encouraged; verbalization of feelings encouraged; self-responsibility promoted; relaxation techniques promoted; self-reflection promoted; problem solving facilitated; goal setting facilitated     Problem: Overarching Goals (Adult)  Goal: Develops/Participates in Therapeutic [place] to Support Successful Transition  Intervention: Foster Therapeutic [place]  Flowsheets (Taken 1/8/2020 1450)  Trust Relationship/Rapport: care explained; choices provided; emotional support provided; empathic listening provided; reassurance provided; questions encouraged; questions answered; thoughts/feelings acknowledged     Problem: Overarching Goals (Adult)  Goal: Develops/Participates in Therapeutic [place] to Support Successful Transition  Intervention: Mutually Develop Transition Plan  Flowsheets (Taken 1/8/2020 1450)  Transition Support: community resources reviewed; crisis management plan promoted; follow-up care discussed       DATA:         Therapist met individually with patient this date to introduce role and to discuss hospitalization expectations. Patient agreeable. Patient is a 59 year-old  single,  male living in [facility].  Per intake, he presents as voluntary admit with reports of worsening depression.  Patient's family reports patient has not been taking care of himself, not bathing, not cleaning his home, and are worried for his safety.  Patient appears cooperative with minimal insight.  Patient historically diagnosed with Dementia, HIV, and Hep C.  He reported feeling anxious.  Patient admitted for safety and stabilization.     Clinical Maneuvering/Intervention:     Therapist assisted patient in processing above session content; acknowledged and normalized patient’s thoughts, feelings, and concerns.  Discussed the therapist/patient relationship and explain the parameters and limitations of relative confidentiality.  Also discussed the importance active participation, and honesty to the treatment process.  Encouraged the patient to discuss/vent their feelings, frustrations, and fears concerning their ongoing medical issues and validated her feelings.     Discussed the importance of finding enjoyable activities and coping skills that the patient can engage in a regular basis. Discussed healthy coping skills such as distraction, self love, grounding, thought challenges/reframing, etc.  Provided patient with list of healthy coping skills this date. Discussed the importance of medication compliance.  Praised the patient for seeking help and spent the majority of the session building rapport.       Allowed patient to freely discuss issues without interruption or judgment. Provided safe, confidential environment to facilitate the development of positive therapeutic relationship and encourage open, honest communication.      Therapist addressed discharge safety planning this date. Assisted patient in identifying risk factors which would indicate the need for higher level of care after discharge;  including thoughts to harm self or others and/or self-harming behavior. Encouraged patient to  call 911, or present to the nearest emergency room should any of these events occur. Discussed crisis intervention services and means to access.  Encouraged securing any objects of harm.       Therapist completed integrated summary, treatment plan, and initiated social history this date.  Therapist is strongly encouraging family involvement in treatment.  Patient agreeable for state guardian to be involved with treatment.     ASSESSMENT:      The patient displayed appropriate affect and anxious mood.  He denied SI, HI, and AVH.  Patient oriented to place and person.  Patient displayed rambling, garbled speech and disorganized thinking.  Patient reported poor sleep and fair appetite.     PLAN:       Patient to remain hospitalized this date.     Treatment team will focus efforts on stabilizing patient's acute symptoms while providing education on healthy coping and crisis management to reduce hospitalizations.   Patient requires daily psychiatrist evaluation and 24/7 nursing supervision to promote patient  safety.     Therapist will offer 1-4 individual sessions (20-30 minutes each), 1 therapy group daily, family education, and appropriate referral.    Patient's state guardian provided verbal consent for referrals to be sent to [facility], [facility], [facility], [facility], and [facility].  Navigator to assist.

## 2020-01-08 NOTE — PROGRESS NOTES
Navigator is helping Primary Therapist with discharge planning for patient. Navigator completed the following referrals on this day:    [facility]   Ph: [phone]  Fax: [phone]  -Sent 1/8    [facility]   Ph: [phone]  Fax: [phone]  -Sent 1/8    [facility]  Ph: [phone]  Fax: [phone]  -Sent 1/8    [facility]   Ph: [phone]  Fax: [phone]  -Sent 1/8    [facility]   Ph: [phone]  Fax: [phone]  -Sent 1/8

## 2020-01-08 NOTE — NURSING NOTE
"Presented to ED along with his father and brother for psychiatric evaluation.  Per ED documentation, family reported that he has not been cleaning his home, or tending to his personal hygiene.  They also reported that he wanders, and has been found looking into neighbor's windows.  Per EMR, he has a hx of HIV, dementia, and encephalopathy.  Per ED documentation, the family does not feel that pt is safe at home, and that he is no longer able to care for himself.  They are attempting to get him into a skilled nursing facility.  Upon admission, pt is cooperative and able to participate with assessment.  He is oriented to time, person, and place, but not situation.  When asked why he feels his family brought him here he states \"I can't imagine what it was for.\"  His thoughts are disorganized, and he has hyper verbal, rambling, garbled speech.    "

## 2020-01-08 NOTE — DISCHARGE PLACEMENT REQUEST
"Kathy Feliciano (59 y.o. Male)     Date of Birth Social Security Number Address Home Phone MRN    1960  [address] [place] [address] [phone] [id number]    Gnosticism Marital Status          Caodaism Single       Admission Date Admission Type Admitting Provider Attending Provider Department, Room/Bed    20 Emergency Robby Holman MD Briscoe, Brian T, MD [facility]ULT [facility], 1019/1S    Discharge Date Discharge Disposition Discharge Destination                       Attending Provider:  Robby Holman MD    Allergies:  Amoxicillin, Penicillins    Isolation:  None   Infection:  None   Code Status:  CPR    Ht:  165.1 cm (65\")   Wt:  66.3 kg (146 lb 3.2 oz)    Admission Cmt:  None   Principal Problem:  Psychosis (CMS/[facility]) [F29]                 Active Insurance as of 2020     Primary Coverage     Payor Plan Insurance Group Employer/Plan Group    MEDICARE MEDICARE A & B      Payor Plan Address Payor Plan Phone Number Payor Plan Fax Number Effective Dates    [address] [phone]  2008 - None Entered    [place] [address]       Subscriber Name Subscriber Birth Date Member ID       KATHY FELICIANO 1960 [id number]                 Emergency Contacts      (Rel.) Home Phone Work Phone Mobile Phone    [facility] sil[facility]anaAlla (Guardian) -- [phone] --    Eliezer Feliciano (Father) [phone] -- --               History & Physical      Winsome Fish RN at 20 1035          INITIAL PSYCHIATRIC HISTORY & PHYSICAL    Patient Identification:  Name:   Kathy Feliciano  Age:   59 y.o.  Sex:   male  :   1960  MRN:   [id number]  Visit Number:   [id number]  Primary Care Physician:   Aleksandra Hudson MD    SUBJECTIVE    CC/Focus of Exam: Psychosis    HPI: This is the first [facility] admission for Kathy Feliciano who is a 59 y.o. Caodaism, college educated, retired from [employer] male who was admitted on 2020 with " "complaints of not completing his ADLs, wandering around even in [place], and looking into neighbors windows.  The patient has no memory of this.  The family reported that the patient is not safe in his home, and he is unable to care for himself anymore.  They are requesting nursing home placement for the patient.    The patient is able to state his name and where he is at, but states he does not know why he is here.The patient says the season is spring, but did know that Jose is the .  Then goes on to say, that he has been in Jose's house and [facility] [place]. 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  The sister corroborates this.     It is hard to elicit much reliable information from the patient.  He is rambling and mumbling with pressured speech during interview.  He is hard to understand.  Patient jumps from one topic to another.  An example of this is wanting to talk about an episode of the Marcelo Len[employer]ask show, and then jump to talking about his puppy.  The patient's family had reported that his house was in disarray, but patient reports that \"I am proud of my house and the floors are shiny\".  Patient states the brother pays his bills out of his FDC of $1357 per month.  Also states the family takes care of his medication.  Patient denies any depression or auditory or visual hallucinations. He obtained a double bachelor degrees and a master's degree.  States he was in [place] studying international relations.  Then states he came back to the [place] to work at [employer], \"running the [employer]\".    Asked the patient about his HIV status, and he states that he did not know he had it.  States he takes a green and a red pill, but only knows they are for infection.    Medical: History of right knee injury in the past, cholecystectomy, tonsillectomy.  HIV and hepatitis B.  Allergic to penicillin.    Available medical/psychiatric records " "reviewed and incorporated into the current document.     PAST PSYCHIATRIC HX: Patient denies any past psychiatric history.    SUBSTANCE USE HX: Denies any history of alcohol or illicit drug abuse.  Smokes cigarettes 1 pack a day for 25 years.  UDS is negative.       FAMILY HX: Maternal uncle had bipolar disorder.  No suicides among first-degree relatives.    SOCIAL HX: Patient was born in [place], [place].  States his father was in the [employer] and he was raised by his mother [place] [employer].  He describes himself as a \"nerd that always had his nose in a book\".  States he has 2 bachelor's degrees and a masters degree in international relations.  First states that he is single, but later states he  a woman while he was in [place].  States he moved to [place], [place] 9 years ago.  Denies any legal issues.  Patient does not have any children, and no history of  service.    Past Medical History:   Diagnosis Date   • Dementia (CMS/HCC)    • Encephalopathy    • Headache    • Hepatitis B    • HIV (human immunodeficiency virus infection) (CMS/HCC)    • Infectious viral hepatitis    • Renal mass    • Tobacco abuse        Past Surgical History:   Procedure Laterality Date   • CHOLECYSTECTOMY  2013   • KNEE SURGERY Right 1988    [place]   • TONSILLECTOMY         Family History   Problem Relation Age of Onset   • Heart disease Mother    • Hypertension Father    • Asthma Father    • COPD Father    • Cancer Paternal Uncle    • Heart disease Maternal Grandmother    • Macular degeneration Maternal Grandmother    • Heart disease Maternal Grandfather    • Macular degeneration Maternal Grandfather          Medications Prior to Admission   Medication Sig Dispense Refill Last Dose   • darunavir ethanolate (PREZISTA) 800 MG tablet tablet Take 800 mg by mouth Daily With Breakfast.   Taking   • Vquxdzs-Yextm-Ovepoqls-TenofAF (GENVOYA) [phone] MG tablet Take  by mouth.   Taking   • levoFLOXacin " (LEVAQUIN) 500 MG tablet Take 500 mg by mouth Daily.   Not Taking   • Multiple Vitamin (MULTI-DAY VITAMINS PO) Take  by mouth.   Taking           ALLERGIES:  Amoxicillin and Penicillins    Temp:  [97.3 °F (36.3 °C)-99.2 °F (37.3 °C)] 99.2 °F (37.3 °C)  Heart Rate:  [72-84] 84  Resp:  [18] 18  BP: (105-142)/(22-80) 137/22    REVIEW OF SYSTEMS:  Review of Systems   See HPI for psychiatric ROS  OBJECTIVE    PHYSICAL EXAM:  Physical Exam    MENTAL STATUS EXAM:               Hygiene:   poor  Cooperation:  Cooperative  Eye Contact:  Fair  Psychomotor Behavior:  Appropriate  Affect:  Blunted  Hopelessness: Denies  Speech:  Pressured, Rambling and Mumbled  Thought Progress:  Tangential  Thought Content:  Bizarre  Suicidal:  None  Homicidal:  None  Hallucinations:  None  Delusion:  None  Memory:  Intact  Orientation:  Person, Place and Time  Reliability:  fair  Insight:  Poor  Judgement:  Poor  Impulse Control:  Poor      Imaging Results (Last 24 Hours)     ** No results found for the last 24 hours. **           ECG/EMG Results (most recent)     Procedure Component Value Units Date/Time    ECG 12 Lead [[id number]] Collected:  01/08/20 0428     Updated:  01/08/20 0433    Narrative:       Test Reason : Baseline Cardiac Status  Blood Pressure : **/** mmHG  Vent. Rate : 078 BPM     Atrial Rate : 078 BPM     P-R Int : 180 ms          QRS Dur : 080 ms      QT Int : 392 ms       P-R-T Axes : 078 003 063 degrees     QTc Int : 446 ms    Normal sinus rhythm  Possible Left atrial enlargement  Borderline ECG  No previous ECGs available    Referred By:  JEFF           Confirmed By:            Lab Results   Component Value Date    GLUCOSE 68 01/07/2020    BUN 24 (H) 01/07/2020    CREATININE 1.03 01/07/2020    EGFRIFNONA 74 01/07/2020    BCR 23.3 01/07/2020    CO2 30.6 (H) 01/07/2020    CALCIUM 9.7 01/07/2020    ALBUMIN 4.20 01/07/2020    AST 19 01/07/2020    ALT 16 01/07/2020       Lab Results   Component Value Date    WBC 6.52  01/07/2020    HGB 15.6 01/07/2020    HCT 45.8 01/07/2020    .6 (H) 01/07/2020     01/07/2020       Pain Management Panel     Pain Management Panel Latest Ref Rng & Units 1/7/2020    AMPHETAMINES SCREEN, URINE Negative Negative    BARBITURATES SCREEN Negative Negative    BENZODIAZEPINE SCREEN, URINE Negative Negative    BUPRENORPHINEUR Negative Negative    COCAINE SCREEN, URINE Negative Negative    METHADONE SCREEN, URINE Negative Negative          Brief Urine Lab Results  (Last result in the past 365 days)      Color   Clarity   Blood   Leuk Est   Nitrite   Protein   CREAT   Urine HCG        01/07/20 1643 Yellow Cloudy Small (1+) Large (3+) Positive Negative               Reviewed labs and studies done with this admission.       ASSESSMENT & PLAN:        Psychosis (CMS/[facility])    HIV (human immunodeficiency virus infection) (CMS/[facility])    Tobacco use disorder    UTI (urinary tract infection)        The patient has been admitted for safety and stabilization.  Patient will be monitored for suicidality daily and maintained on Sucide precaution Level 3 (q15 min checks) . The patient will have individual and group therapy with a master's level therapist. A master treatment plan will be developed and agreed upon by the patient and his/her treatment team.  The patient's estimated length of stay in the hospital is 5-7 days.       Written by Hossein Fish RN, acting as scribe for Dr. Holman. Dr. Holman's signature on this note affirms that the note adequately documents the care provided.     Wnisome Fish RN  01/08/20  10:35 AM    Electronically signed by Winsome Fish RN at 01/08/20 1100         Current Facility-Administered Medications   Medication Dose Route Frequency Provider Last Rate Last Dose   • acetaminophen (TYLENOL) tablet 650 mg  650 mg Oral Q6H PRN Robby Holman MD       • aluminum-magnesium hydroxide-simethicone (MAALOX MAX) 400-400-40 MG/5ML suspension 15 mL  15 mL Oral Q6H PRN Manda  Robby RAMIREZ MD       • benzonatate (TESSALON) capsule 100 mg  100 mg Oral TID PRN Robby Holman MD       • benztropine (COGENTIN) tablet 2 mg  2 mg Oral Once PRN Robby Holman MD        Or   • benztropine (COGENTIN) injection 1 mg  1 mg Intramuscular Once PRN Robby Holman MD       • darunavir ethanolate (PREZISTA) tablet 800 mg  800 mg Oral Daily With Breakfast Oziel Wiggins MD       • Rufhtrj-Xfnwj-Ukqqzepu-TenofAF (GENVOYA) [phone] MG per tablet 1 tablet  1 tablet Oral Daily With Breakfast Oziel Wiggins MD       • famotidine (PEPCID) tablet 20 mg  20 mg Oral BID PRN Robby Holman MD       • hydrOXYzine (ATARAX) tablet 50 mg  50 mg Oral Q6H PRN Robby Holman MD   50 mg at 01/08/20 0917   • ibuprofen (ADVIL,MOTRIN) tablet 400 mg  400 mg Oral Q6H PRN Robby Holman MD       • loperamide (IMODIUM) capsule 2 mg  2 mg Oral Q2H PRN Robby Holman MD       • magnesium hydroxide (MILK OF MAGNESIA) suspension 2400 mg/10mL 10 mL  10 mL Oral Daily PRN Robby Holman MD       • multivitamin (DAILY MERARI) tablet 1 tablet  1 tablet Oral Daily Oziel Wiggins MD       • nitrofurantoin (macrocrystal-monohydrate) (MACROBID) capsule 100 mg  100 mg Oral BID Robby Holman MD   100 mg at 01/08/20 0917   • ondansetron (ZOFRAN) tablet 4 mg  4 mg Oral Q6H PRN Robby Holman MD       • sodium chloride nasal spray 2 spray  2 spray Each Nare PRN Robby Holman MD       • traZODone (DESYREL) tablet 50 mg  50 mg Oral Nightly PRN Robby Holman MD         Physician Progress Notes (last 72 hours) (Notes from 01/05/20 1540 through 01/08/20 1540)    No notes of this type exist for this encounter.

## 2020-01-09 LAB
HIV 1 & 2 AB SERPLBLD IA.RAPID: ABNORMAL
HIV 2 AB SERPLBLD QL IA.RAPID: NEGATIVE
HIV1 AB SERPLBLD QL IA.RAPID: POSITIVE

## 2020-01-09 PROCEDURE — 99232 SBSQ HOSP IP/OBS MODERATE 35: CPT | Performed by: PSYCHIATRY & NEUROLOGY

## 2020-01-09 RX ADMIN — TRAZODONE HYDROCHLORIDE 50 MG: 50 TABLET ORAL at 20:13

## 2020-01-09 RX ADMIN — Medication 1 TABLET: at 09:35

## 2020-01-09 RX ADMIN — NITROFURANTOIN MONOHYDRATE/MACROCRYSTALLINE 100 MG: 25; 75 CAPSULE ORAL at 20:13

## 2020-01-09 RX ADMIN — NITROFURANTOIN MONOHYDRATE/MACROCRYSTALLINE 100 MG: 25; 75 CAPSULE ORAL at 09:35

## 2020-01-09 RX ADMIN — Medication 800 MG: at 09:54

## 2020-01-09 RX ADMIN — Medication 1 TABLET: at 09:54

## 2020-01-09 RX ADMIN — HYDROXYZINE HYDROCHLORIDE 50 MG: 50 TABLET ORAL at 20:13

## 2020-01-09 NOTE — PLAN OF CARE
Problem: Patient Care Overview  Goal: Plan of Care Review  Outcome: Ongoing (interventions implemented as appropriate)  Flowsheets  Taken 1/8/2020 1449 by Flor Rosario  Consent Given to Review Plan with: State guardian.  Taken 1/8/2020 1553 by Raina Hong, RN  Progress: no change  Taken 1/9/2020 1031 by Andrews Hong RN  Plan of Care Reviewed With: patient  Patient Agreement with Plan of Care: agrees  Taken 1/9/2020 0139 by Loreta Cowart RN  Outcome Summary: Pt calm and cooperative. Denies SI/HI or AVH. Rambling speech.  Note:   PATIENT DENIES ANY PROBLEMS THIS SHIFT. PATIENT IS STILL HYPER VERBAL WITH FLIGHT OF IDEAS. PATIENT REMAINS IN HIS ROOM MOST OF THE TIME AND DOES NOT INTERACT WITH OTHERS. NO NEW ORDERS OR ACUTE S/S OF DISTRESS NOTED.

## 2020-01-09 NOTE — PROGRESS NOTES
"INPATIENT PSYCHIATRIC PROGRESS NOTE    Name:  Noe Feliciano  :  1960  MRN:  [id number]  Visit Number:  [id number]  Length of stay:  2    SUBJECTIVE  CC/Focus of Exam: psychosis    INTERVAL HISTORY:  The patient reports he is feeling good, and added they call me \"Pollyana\", and added that the other students in [employer] would call him that. He continues to be a bit disorganized with rambling speech but is redirectable and pleasant.  Depression rating 0/10  Anxiety rating 0/10  Sleep: good  Withdrawal sx: denies  Cravin/10    Review of Systems   Respiratory: Negative.    Cardiovascular: Negative.    Gastrointestinal: Negative.    Psychiatric/Behavioral: Positive for confusion.       OBJECTIVE    Temp:  [97.1 °F (36.2 °C)-99.7 °F (37.6 °C)] 99 °F (37.2 °C)  Heart Rate:  [] 83  Resp:  [18] 18  BP: (121-123)/(65-79) 121/65    MENTAL STATUS EXAM:  Appearance:disheveled   Cooperation:Cooperative  Psychomotor: No psychomotor agitation/retardation, No EPS, No motor tics  Speech-normal rate, amount.  Mood \"good\"   Affect-congruent, appropriate, stable  Thought Content-goal directed, no delusional material present  Thought process-linear, organized.  Suicidality: No SI  Homicidality: No HI  Perception: No AH/VH  Insight-poor   Judgement-poor    Lab Results (last 24 hours)     ** No results found for the last 24 hours. **             Imaging Results (Last 24 Hours)     ** No results found for the last 24 hours. **             ECG/EMG Results (most recent)     Procedure Component Value Units Date/Time    ECG 12 Lead [[id number]] Collected:  20     Updated:  20    Narrative:       Test Reason : Baseline Cardiac Status  Blood Pressure : **/** mmHG  Vent. Rate : 078 BPM     Atrial Rate : 078 BPM     P-R Int : 180 ms          QRS Dur : 080 ms      QT Int : 392 ms       P-R-T Axes : 078 003 063 degrees     QTc Int : 446 ms    Normal sinus rhythm  Incoplete RBBB  Confirmed by Estela Decker " (2003) on 1/8/2020 7:19:51 PM    Referred By:  JEFF           Confirmed By:Estela Decker           ALLERGIES: Amoxicillin and Penicillins      Current Facility-Administered Medications:   •  acetaminophen (TYLENOL) tablet 650 mg, 650 mg, Oral, Q6H PRN, Robby Holman MD  •  aluminum-magnesium hydroxide-simethicone (MAALOX MAX) 400-400-40 MG/5ML suspension 15 mL, 15 mL, Oral, Q6H PRN, Robby Holman MD  •  benzonatate (TESSALON) capsule 100 mg, 100 mg, Oral, TID PRN, Robby Holman MD  •  benztropine (COGENTIN) tablet 2 mg, 2 mg, Oral, Once PRN **OR** benztropine (COGENTIN) injection 1 mg, 1 mg, Intramuscular, Once PRN, Robby Holman MD  •  darunavir ethanolate (PREZISTA) tablet 800 mg, 800 mg, Oral, Daily With Breakfast, Oziel Wiggins MD, 800 mg at 01/09/20 0954  •  Veafhea-Kabhx-Zrcytabq-TenofAF (GENVOYA) [phone] MG per tablet 1 tablet, 1 tablet, Oral, Daily With Breakfast, Oziel Wiggins MD, 1 tablet at 01/09/20 0954  •  famotidine (PEPCID) tablet 20 mg, 20 mg, Oral, BID PRN, Robby Holman MD  •  hydrOXYzine (ATARAX) tablet 50 mg, 50 mg, Oral, Q6H PRN, Robby Holman MD, 50 mg at 01/08/20 0917  •  ibuprofen (ADVIL,MOTRIN) tablet 400 mg, 400 mg, Oral, Q6H PRN, Robby Holman MD  •  loperamide (IMODIUM) capsule 2 mg, 2 mg, Oral, Q2H PRN, Robby Holman MD  •  magnesium hydroxide (MILK OF MAGNESIA) suspension 2400 mg/10mL 10 mL, 10 mL, Oral, Daily PRN, Robby Holman MD  •  multivitamin (DAILY MERARI) tablet 1 tablet, 1 tablet, Oral, Daily, Oziel Wiggins MD, 1 tablet at 01/09/20 0935  •  nitrofurantoin (macrocrystal-monohydrate) (MACROBID) capsule 100 mg, 100 mg, Oral, BID, Robby Holman MD, 100 mg at 01/09/20 0935  •  ondansetron (ZOFRAN) tablet 4 mg, 4 mg, Oral, Q6H PRN, Robby Holman MD  •  sodium chloride nasal spray 2 spray, 2 spray, Each Nare, PRN, Robby Holman MD  •  traZODone (DESYREL) tablet 50 mg, 50 mg, Oral, Nightly PRN, Robby Holman,  MD    ASSESSMENT & PLAN:      Psychosis (CMS/[facility])  - some better, the patient is confused, but pleasant and redirectable.      HIV (human immunodeficiency virus infection) (CMS/[facility])  - Continue home meds      Tobacco use disorder  - Nicoderm pathc      UTI (urinary tract infection)  - Macrobid    Suicide precautions: Suicide precaution Level 3 (q15 min checks)     Behavioral Health Treatment Plan and Problem List: I have reviewed and approved the Behavioral Health Treatment Plan and Problem list.  The patient has had a chance to review and agrees with the treatment plan.     Clinician:  Oziel Wiggins MD  01/09/20  11:34 AM

## 2020-01-09 NOTE — PLAN OF CARE
Problem: Patient Care Overview  Goal: Plan of Care Review  Outcome: Ongoing (interventions implemented as appropriate)  Flowsheets (Taken 1/9/2020 4252)  Plan of Care Reviewed With: patient  Patient Agreement with Plan of Care: agrees  Outcome Summary: Pt calm and cooperative. Denies SI/HI or AVH. Rambling speech.

## 2020-01-10 PROCEDURE — 99232 SBSQ HOSP IP/OBS MODERATE 35: CPT | Performed by: PSYCHIATRY & NEUROLOGY

## 2020-01-10 RX ORDER — ARIPIPRAZOLE 2 MG/1
2 TABLET ORAL DAILY
Status: DISCONTINUED | OUTPATIENT
Start: 2020-01-10 | End: 2020-01-11

## 2020-01-10 RX ADMIN — NITROFURANTOIN MONOHYDRATE/MACROCRYSTALLINE 100 MG: 25; 75 CAPSULE ORAL at 20:48

## 2020-01-10 RX ADMIN — Medication 800 MG: at 09:27

## 2020-01-10 RX ADMIN — TRAZODONE HYDROCHLORIDE 50 MG: 50 TABLET ORAL at 20:48

## 2020-01-10 RX ADMIN — Medication 1 TABLET: at 09:27

## 2020-01-10 RX ADMIN — NITROFURANTOIN MONOHYDRATE/MACROCRYSTALLINE 100 MG: 25; 75 CAPSULE ORAL at 09:27

## 2020-01-10 RX ADMIN — HYDROXYZINE HYDROCHLORIDE 50 MG: 50 TABLET ORAL at 20:48

## 2020-01-10 RX ADMIN — ARIPIPRAZOLE 2 MG: 2 TABLET ORAL at 12:40

## 2020-01-10 NOTE — PLAN OF CARE
Problem: Patient Care Overview  Goal: Plan of Care Review  Outcome: Ongoing (interventions implemented as appropriate)  Flowsheets  Taken 1/8/2020 1449 by Flor Rosario  Consent Given to Review Plan with: State guardian.  Taken 1/8/2020 1553 by Raina Hong, RN  Progress: no change  Taken 1/10/2020 1107 by Andrews Hong RN  Plan of Care Reviewed With: patient  Patient Agreement with Plan of Care: agrees  Taken 1/9/2020 0139 by Loreta Cowart RN  Outcome Summary: Pt calm and cooperative. Denies SI/HI or AVH. Rambling speech.  Note:   PATIENT VERBALIZES MILD DEPRESSION AS HIS ONLY PROBLEM THIS SHIFT. PATIENT IS AOX3 AND APPEARS STABLE. NO S/S OF ACUTE DISTRESS NOTED. NEW ORDERS: ABILIFY 2MG. PATIENTS FAMILY MAY VISIT TOMORROW AND PATIENT MAY D/C TO A NURSING HOME SUNDAY

## 2020-01-10 NOTE — PLAN OF CARE
Denied anxiety, depression, S.I., H.I., hallucinations and paranoia. However, patient is hypeverbal with flight of ideas. Cooperative, alert and oriented. Denies he has any problems taking care of himself or his home. Wanting to be discharged today. Slept approx. 7 hours this shift.

## 2020-01-10 NOTE — PROGRESS NOTES
"INPATIENT PSYCHIATRIC PROGRESS NOTE    Name:  Noe Feliciano  :  1960  MRN:  [id number]  Visit Number:  [id number]  Length of stay:  3    SUBJECTIVE  CC/Focus of Exam: psychosis    INTERVAL HISTORY:  The patient has been hyper-verbal with flight of ideas and episodes of confusion.  States he is feeling depressed because he is not home. States it is 2020 and he is at a hospital in [place] and the name of the hospital starts with a T. States he doesn't know why he is here.  Depression rating 5-6/10  Anxiety rating 0/10  Sleep: good  Withdrawal sx: denies  Cravin/10    Review of Systems   Respiratory: Negative.    Cardiovascular: Negative.    Gastrointestinal: Negative.    Psychiatric/Behavioral: Positive for confusion.       OBJECTIVE    Temp:  [98.4 °F (36.9 °C)-98.6 °F (37 °C)] 98.6 °F (37 °C)  Heart Rate:  [72-90] 72  Resp:  [18-20] 18  BP: (108-147)/(61-77) 108/61    MENTAL STATUS EXAM:  Appearance:disheveled   Cooperation:Cooperative  Psychomotor: No psychomotor agitation/retardation, No EPS, No motor tics  Speech-pressured.  Mood \"depressed\"   Affect-congruent, appropriate, stable  Thought Content-appears delusional at times  Thought process-flight of ideas.  Suicidality: No SI  Homicidality: No HI  Perception: No AH/VH  Insight-poor   Judgement-poor    Lab Results (last 24 hours)     ** No results found for the last 24 hours. **             Imaging Results (Last 24 Hours)     ** No results found for the last 24 hours. **             ECG/EMG Results (most recent)     Procedure Component Value Units Date/Time    ECG 12 Lead [[id number]] Collected:  20     Updated:  20    Narrative:       Test Reason : Baseline Cardiac Status  Blood Pressure : **/** mmHG  Vent. Rate : 078 BPM     Atrial Rate : 078 BPM     P-R Int : 180 ms          QRS Dur : 080 ms      QT Int : 392 ms       P-R-T Axes : 078 003 063 degrees     QTc Int : 446 ms    Normal sinus rhythm  Incoplete " RBBB  Confirmed by Estela Decker (2003) on 1/8/2020 7:19:51 PM    Referred By:  JEFF           Confirmed By:Estela Decker           ALLERGIES: Amoxicillin and Penicillins      Current Facility-Administered Medications:   •  acetaminophen (TYLENOL) tablet 650 mg, 650 mg, Oral, Q6H PRN, Robby Holman MD  •  aluminum-magnesium hydroxide-simethicone (MAALOX MAX) 400-400-40 MG/5ML suspension 15 mL, 15 mL, Oral, Q6H PRN, Robby Holman MD  •  benzonatate (TESSALON) capsule 100 mg, 100 mg, Oral, TID PRN, Robby Holman MD  •  benztropine (COGENTIN) tablet 2 mg, 2 mg, Oral, Once PRN **OR** benztropine (COGENTIN) injection 1 mg, 1 mg, Intramuscular, Once PRN, Robby Holman MD  •  darunavir ethanolate (PREZISTA) tablet 800 mg, 800 mg, Oral, Daily With Breakfast, Oziel Wiggins MD, 800 mg at 01/10/20 0927  •  Bttxsen-Gaiml-Jsorkkbd-TenofAF (GENVOYA) [phone] MG per tablet 1 tablet, 1 tablet, Oral, Daily With Breakfast, Oziel Wiggins MD, 1 tablet at 01/10/20 0927  •  famotidine (PEPCID) tablet 20 mg, 20 mg, Oral, BID PRN, Robby Holman MD  •  hydrOXYzine (ATARAX) tablet 50 mg, 50 mg, Oral, Q6H PRN, Robby Holman MD, 50 mg at 01/09/20 2013  •  ibuprofen (ADVIL,MOTRIN) tablet 400 mg, 400 mg, Oral, Q6H PRN, Robby Holman MD  •  loperamide (IMODIUM) capsule 2 mg, 2 mg, Oral, Q2H PRN, Robby Holman MD  •  magnesium hydroxide (MILK OF MAGNESIA) suspension 2400 mg/10mL 10 mL, 10 mL, Oral, Daily PRN, Robby Holman MD  •  multivitamin (DAILY MERARI) tablet 1 tablet, 1 tablet, Oral, Daily, Oziel Wiggins MD, 1 tablet at 01/10/20 0927  •  nitrofurantoin (macrocrystal-monohydrate) (MACROBID) capsule 100 mg, 100 mg, Oral, BID, Robby Holman MD, 100 mg at 01/10/20 0927  •  ondansetron (ZOFRAN) tablet 4 mg, 4 mg, Oral, Q6H PRN, Robby Holman MD  •  sodium chloride nasal spray 2 spray, 2 spray, Each Nare, PRN, Robby Holman MD  •  traZODone (DESYREL) tablet 50 mg, 50 mg, Oral, Nightly  Manda DIAS Brian T, MD, 50 mg at 01/09/20 2013    ASSESSMENT & PLAN:      Psychosis (CMS/[facility])  - Patient continues to be confused. Differential includes early dementia, and bipolar disorder with manic episode though he is sleeping well and only has an element of pressured speech and flight of ideas.   - Will start him on low dose Abilify.      HIV (human immunodeficiency virus infection) (CMS/[facility])  - Continue home meds      Tobacco use disorder  - Nicoderm pathc      UTI (urinary tract infection)  - Macrobid    Suicide precautions: Suicide precaution Level 3 (q15 min checks)     Behavioral Health Treatment Plan and Problem List: I have reviewed and approved the Behavioral Health Treatment Plan and Problem list.  The patient has had a chance to review and agrees with the treatment plan.     Clinician:  Oziel Wiggins MD  01/10/20  10:35 AM

## 2020-01-10 NOTE — PLAN OF CARE
Problem: Patient Care Overview  Goal: Interprofessional Rounds/Family Conf  Flowsheets (Taken 1/10/2020 1716)  Participants: milieu/psych techs; nursing; psychiatrist; social work; other (see comments) (Navigator.)  Summary: Treatment team staffing.     Problem: Overarching Goals (Adult)  Goal: Optimized Coping Skills in Response to Life Stressors  Intervention: Promote Effective Coping Strategies  Flowsheets (Taken 1/10/2020 1716)  Supportive Measures: active listening utilized; counseling provided     DATA:         Therapist met individually with patient this date for inpatient follow up.  Continued to discuss progress with treatment.  Therapist reviewed patient's chart and provided education on resources for aftercare.  Discussed disposition planning.  Patient appeared cooperative and calm.  He reported feeling good today and denied feeling depressed.  He continues stabilization.    Therapist spoke with patient's state guardian Lydia Li to provide update.  Reviewed that patient is awaiting admissions update from [facility] nursing and [facility], who are working on patient's HIV medication approval.  Lydia reported that patient is accepted to [facility] personal care of [place], however she prefers patient to attend a nursing home placement.  She also provided verbal consent for patient's parents to visit.  She denied concerns and appeared supportive.     Clinical Maneuvering/Intervention:     Therapist assisted patient in processing above session content; acknowledged and normalized patient’s thoughts, feelings, and concerns.  Discussed the therapist/patient relationship and explain the parameters and limitations of relative confidentiality.  Also discussed the importance active participation, and honesty to the treatment process.  Encouraged the patient to discuss/vent her feelings, frustrations, and fears concerning ongoing medical issues and validated patient's  "feelings.     Discussed the importance of finding enjoyable activities and coping skills that the patient can engage in a regular basis. Discussed healthy coping skills such as distraction, self love, grounding, thought challenges/reframing, etc.  Provided patient with list of healthy coping skills this date. Discussed the importance of medication compliance.       Allowed patient to freely discuss issues without interruption or judgment. Provided safe, confidential environment to facilitate the development of positive therapeutic relationship and encourage open, honest communication.       ASSESSMENT:      Patient appeared to display appropriate affect and \"good\" mood.  He denied SI, HI, and AVH.  Patient oriented x3 with limited insight.       PLAN:       Patient to remain hospitalized this date.  Patient awaiting admissions update from [facility] and [facility].  If unable to attend nursing home, patient is accepted to [facility].  Patient will need transportation assistance upon discharge.    "

## 2020-01-10 NOTE — PROGRESS NOTES
Navigator is helping Primary Therapist with discharge planning for patient. Navigator completed the following referrals on this day:     [facility]   Ph: [phone]  Fax: [phone]  -Sent 1/8  -No Beds Available 1/10     [facility]   Ph: [phone]  Fax: [phone]  -Sent 1/8  -Left message 1/10     [facility]  Ph: [phone]  Fax: [phone]  -Sent 1/8  -Nicolette velazquez to call back on Monday. DON is checking the august of meds. 1/10     [facility]   Ph: [phone]  Fax: [phone]  -Sent 1/8  -Tiffanie out to do visits at this time 1/10     [facility]   Ph: [phone]  Fax: [phone]  -Sent 1/8

## 2020-01-10 NOTE — PLAN OF CARE
Problem: Patient Care Overview  Goal: Interprofessional Rounds/Family Conf  Outcome: Ongoing (interventions implemented as appropriate)  Flowsheets (Taken 1/9/2020 1010)  Participants: psychiatrist; social work; nursing; milieu/psych techs  Summary: Treatment team staffing.     DATA:         Therapist met individually with patient this date to introduce role and to discuss hospitalization expectations. Patient agreeable. Patient reported feeling okay today and not depressed.  He appeared disorganized with garbled speech and cooperative.  Patient denied concerns.  He continues stabilization.     Clinical Maneuvering/Intervention:     Therapist assisted patient in processing above session content; acknowledged and normalized patient’s thoughts, feelings, and concerns.  Discussed the therapist/patient relationship and explain the parameters and limitations of relative confidentiality.  Also discussed the importance active participation, and honesty to the treatment process.  Encouraged the patient to discuss/vent their feelings, frustrations, and fears concerning their ongoing medical issues and validated her feelings.     Discussed the importance of finding enjoyable activities and coping skills that the patient can engage in a regular basis. Discussed healthy coping skills such as distraction, self love, grounding, thought challenges/reframing, etc.  Provided patient with list of healthy coping skills this date. Discussed the importance of medication compliance.  Praised the patient for seeking help and spent the majority of the session building rapport.       Allowed patient to freely discuss issues without interruption or judgment. Provided safe, confidential environment to facilitate the development of positive therapeutic relationship and encourage open, honest communication.      Therapist addressed discharge safety planning this date. Assisted patient in identifying risk factors which would indicate the need  "for higher level of care after discharge;  including thoughts to harm self or others and/or self-harming behavior. Encouraged patient to call 911, or present to the nearest emergency room should any of these events occur. Discussed crisis intervention services and means to access.  Encouraged securing any objects of harm.       Therapist completed integrated summary, treatment plan, and initiated social history this date.  Therapist is strongly encouraging family involvement in treatment.       ASSESSMENT:      The patient displayed appropriate affect and \"okay\" mood.  He denied SI, HI, and AVH.  Patient displayed poor insight and poor judgement.     PLAN:       Patient to remain hospitalized this date.     Treatment team will focus efforts on stabilizing patient's acute symptoms while providing education on healthy coping and crisis management to reduce hospitalizations.   Patient requires daily psychiatrist evaluation and 24/7 nursing supervision to promote patient  safety.     Therapist will offer 1-4 individual sessions (20-30 minutes each), 1 therapy group daily, family education, and appropriate referral.    Patient awaiting admissions update from nursing home referrals.      "

## 2020-01-11 PROCEDURE — 99232 SBSQ HOSP IP/OBS MODERATE 35: CPT | Performed by: PSYCHIATRY & NEUROLOGY

## 2020-01-11 RX ORDER — ARIPIPRAZOLE 10 MG/1
5 TABLET ORAL DAILY
Status: DISCONTINUED | OUTPATIENT
Start: 2020-01-12 | End: 2020-01-17 | Stop reason: HOSPADM

## 2020-01-11 RX ADMIN — HYDROXYZINE HYDROCHLORIDE 50 MG: 50 TABLET ORAL at 20:54

## 2020-01-11 RX ADMIN — Medication 1 TABLET: at 09:03

## 2020-01-11 RX ADMIN — Medication 800 MG: at 09:03

## 2020-01-11 RX ADMIN — TRAZODONE HYDROCHLORIDE 50 MG: 50 TABLET ORAL at 20:54

## 2020-01-11 RX ADMIN — NITROFURANTOIN MONOHYDRATE/MACROCRYSTALLINE 100 MG: 25; 75 CAPSULE ORAL at 20:54

## 2020-01-11 RX ADMIN — NITROFURANTOIN MONOHYDRATE/MACROCRYSTALLINE 100 MG: 25; 75 CAPSULE ORAL at 09:03

## 2020-01-11 RX ADMIN — ARIPIPRAZOLE 2 MG: 2 TABLET ORAL at 09:03

## 2020-01-11 NOTE — PLAN OF CARE
Problem: Patient Care Overview  Goal: Plan of Care Review  Outcome: Ongoing (interventions implemented as appropriate)  Flowsheets  Taken 1/11/2020 0606 by Viola Razo, RN  Progress: no change  Taken 1/11/2020 0912 by Andrews Hong RN  Plan of Care Reviewed With: patient  Patient Agreement with Plan of Care: agrees  Taken 1/9/2020 0139 by Loreta Cowart RN  Outcome Summary: Pt calm and cooperative. Denies SI/HI or AVH. Rambling speech.  Note:   PATIENT VERBALIZES PROBLEMS WITH DEPRESSION AS HIS ONLY PROBLEM THIS SHIFT. PATIENT IS AOX3 AND APPEARS STABLE. NO ACUTE S/S OF DISTRESS NOTED. PATIENT HAD AN EPISODE OF INCONTINENCE THIS SHIFT.  NEW ORDERS: INCREASE ABILIFY TO 5MG

## 2020-01-11 NOTE — PROGRESS NOTES
"      Inpatient Psy Progress Note   Clinician: Robby Holman MD  Admission Date: 1/7/2020  10:46 AM 01/11/20    Behavioral Health Treatment Plan and Problem List: I have reviewed and approved the Behavioral Health Treatment Plan and Problem list.    Allergies  Allergies   Allergen Reactions   • Amoxicillin Other (See Comments)     Unknown    • Penicillins Other (See Comments)     Unknown        Hospital Day: 4 days      Assessment completed within view of staff    History  CC/clinical focus: psychosis    Interval HPI: Patient seen and evaluated by me.  Chart reviewed.  Patient continues to demonstrate flight of ideas, significantly disorganized thought processes.  Quite manic.    Med Compliant.  ROS as below.      Interval Review of Systems:   General ROS: negative for - fever or malaise  Endocrine ROS: negative for - palpitations  Respiratory ROS: no cough, shortness of breath, or wheezing  Cardiovascular ROS: no chest pain or dyspnea on exertion  Gastrointestinal ROS: no abdominal pain,no black or bloody stools    /72 (BP Location: Right arm, Patient Position: Sitting)   Pulse 91   Temp 98.9 °F (37.2 °C) (Temporal)   Resp 16   Ht 165.1 cm (65\")   Wt 66.3 kg (146 lb 3.2 oz)   SpO2 95%   BMI 24.33 kg/m²     Mental Status Exam  Mood: euphoric  Affect: increased in range   Thought Processes: tangential  Thought Content: delusional  Hallucinations: no  Suicidal Thoughts: denies  Suicidal Plan/Intent:denies  Hopelesness:Moderate  Homicidal Thoughts:  absent      Medical Decision Making:   Labs:     Lab Results (last 24 hours)     ** No results found for the last 24 hours. **            Radiology:     Imaging Results (Last 24 Hours)     ** No results found for the last 24 hours. **            EKG:     ECG/EMG Results (most recent)     Procedure Component Value Units Date/Time    ECG 12 Lead [[id number]] Collected:  01/08/20 0428     Updated:  01/08/20 1919    Narrative:       Test Reason : Baseline Cardiac " Status  Blood Pressure : **/** mmHG  Vent. Rate : 078 BPM     Atrial Rate : 078 BPM     P-R Int : 180 ms          QRS Dur : 080 ms      QT Int : 392 ms       P-R-T Axes : 078 003 063 degrees     QTc Int : 446 ms    Normal sinus rhythm  Incoplete RBBB  Confirmed by Estela Decker ([id number]) on 1/8/2020 7:19:51 PM    Referred By:  JEFF           Confirmed By:Estela Decker           Medications:     ARIPiprazole 2 mg Oral Daily   darunavir ethanolate 800 mg Oral Daily With Breakfast   Pvbennu-Czxgl-Mocssovo-TenofAF 1 tablet Oral Daily With Breakfast   multivitamin 1 tablet Oral Daily   nitrofurantoin (macrocrystal-monohydrate) 100 mg Oral BID          All medications reviewed.      Assessment and Plan:   Psychosis (CMS/HCC)  - Increase Abilify to 5mg Daily    HIV (human immunodeficiency virus infection) (CMS/HCC)  - Continue home meds       Tobacco use disorder  - Nicoderm path       UTI (urinary tract infection)  - Macrobid  Continue hospitalization for safety and stabilization.  Continue current level of Special Precautions (q15 minute checks).

## 2020-01-12 PROCEDURE — 99232 SBSQ HOSP IP/OBS MODERATE 35: CPT | Performed by: PSYCHIATRY & NEUROLOGY

## 2020-01-12 RX ADMIN — NITROFURANTOIN MONOHYDRATE/MACROCRYSTALLINE 100 MG: 25; 75 CAPSULE ORAL at 20:36

## 2020-01-12 RX ADMIN — ARIPIPRAZOLE 5 MG: 10 TABLET ORAL at 09:14

## 2020-01-12 RX ADMIN — Medication 800 MG: at 09:14

## 2020-01-12 RX ADMIN — Medication 1 TABLET: at 09:15

## 2020-01-12 RX ADMIN — TRAZODONE HYDROCHLORIDE 50 MG: 50 TABLET ORAL at 20:36

## 2020-01-12 RX ADMIN — Medication 1 TABLET: at 09:14

## 2020-01-12 RX ADMIN — NITROFURANTOIN MONOHYDRATE/MACROCRYSTALLINE 100 MG: 25; 75 CAPSULE ORAL at 09:14

## 2020-01-12 RX ADMIN — HYDROXYZINE HYDROCHLORIDE 50 MG: 50 TABLET ORAL at 20:36

## 2020-01-12 NOTE — PLAN OF CARE
PT DENIES ANY ANXIETY, DEPRESSION, SI, HI, OR AVH. PT IS VERY HYPERVERBAL. DISORGANIZED THOUGHTS, FLIGHT OF IDEAS DURING THE NURSING ASSESSMENT.

## 2020-01-12 NOTE — PROGRESS NOTES
"      Inpatient Psy Progress Note   Clinician: Robby Holman MD  Admission Date: 1/7/2020  9:15 AM 01/12/20    Behavioral Health Treatment Plan and Problem List: I have reviewed and approved the Behavioral Health Treatment Plan and Problem list.    Allergies  Allergies   Allergen Reactions   • Amoxicillin Other (See Comments)     Unknown    • Penicillins Other (See Comments)     Unknown        Hospital Day: 5 days      Assessment completed within view of staff    History  CC/clinical focus: psychosis    Interval HPI: Patient seen and evaluated by me.  Chart reviewed. Patient presents as severely disorganized, demonstrating flight of ideas, pressured speech throughout the interview.  He had some incontinence late yesterday.  Med Compliant.  ROS as below.      Interval Review of Systems:   General ROS: negative for - fever or malaise  Endocrine ROS: negative for - palpitations  Respiratory ROS: no cough, shortness of breath, or wheezing  Cardiovascular ROS: no chest pain or dyspnea on exertion  Gastrointestinal ROS: no abdominal pain,no black or bloody stools    /78 (BP Location: Right arm, Patient Position: Sitting)   Pulse 90   Temp 97.7 °F (36.5 °C) (Temporal)   Resp 18   Ht 165.1 cm (65\")   Wt 66.3 kg (146 lb 3.2 oz)   SpO2 97%   BMI 24.33 kg/m²     Mental Status Exam  Mood: anxious  Affect: mood-congruent   Thought Processes: flight of ideas  Thought Content: delusional  Hallucinations: no  Suicidal Thoughts: denies  Suicidal Plan/Intent:denies  Hopelesness:Mild  Homicidal Thoughts:  absent      Medical Decision Making:   Labs:     Lab Results (last 24 hours)     ** No results found for the last 24 hours. **            Radiology:     Imaging Results (Last 24 Hours)     ** No results found for the last 24 hours. **            EKG:     ECG/EMG Results (most recent)     Procedure Component Value Units Date/Time    ECG 12 Lead [[id number]] Collected:  01/08/20 0428     Updated:  01/08/20 1919    " Narrative:       Test Reason : Baseline Cardiac Status  Blood Pressure : **/** mmHG  Vent. Rate : 078 BPM     Atrial Rate : 078 BPM     P-R Int : 180 ms          QRS Dur : 080 ms      QT Int : 392 ms       P-R-T Axes : 078 003 063 degrees     QTc Int : 446 ms    Normal sinus rhythm  Incoplete RBBB  Confirmed by Estela Decker ([id number]) on 1/8/2020 7:19:51 PM    Referred By:  JEFF           Confirmed By:Estela Decker           Medications:     ARIPiprazole 5 mg Oral Daily   darunavir ethanolate 800 mg Oral Daily With Breakfast   Dfwmcwn-Zwuzv-Xiticoox-TenofAF 1 tablet Oral Daily With Breakfast   multivitamin 1 tablet Oral Daily   nitrofurantoin (macrocrystal-monohydrate) 100 mg Oral BID          All medications reviewed.      Assessment and Plan:   Psychosis (CMS/HCC)  - Increase Abilify to 5mg Daily      HIV (human immunodeficiency virus infection) (CMS/HCC)  - Continue home meds       Tobacco use disorder  - Nicoderm path       UTI (urinary tract infection)  - Macrobid    Continue hospitalization for safety and stabilization.  Continue current level of Special Precautions (q15 minute checks).

## 2020-01-13 PROCEDURE — 99232 SBSQ HOSP IP/OBS MODERATE 35: CPT | Performed by: PSYCHIATRY & NEUROLOGY

## 2020-01-13 RX ADMIN — Medication 1 TABLET: at 09:04

## 2020-01-13 RX ADMIN — NITROFURANTOIN MONOHYDRATE/MACROCRYSTALLINE 100 MG: 25; 75 CAPSULE ORAL at 09:04

## 2020-01-13 RX ADMIN — NITROFURANTOIN MONOHYDRATE/MACROCRYSTALLINE 100 MG: 25; 75 CAPSULE ORAL at 20:21

## 2020-01-13 RX ADMIN — TRAZODONE HYDROCHLORIDE 50 MG: 50 TABLET ORAL at 20:21

## 2020-01-13 RX ADMIN — ARIPIPRAZOLE 5 MG: 10 TABLET ORAL at 09:04

## 2020-01-13 RX ADMIN — Medication 800 MG: at 09:04

## 2020-01-13 NOTE — PROGRESS NOTES
Navigator is helping Primary Therapist with discharge planning for patient. Navigator completed the following referrals on this day:     [facility]   Ph: [phone]  Fax: [phone]  -Sent 1/8  -No Beds Available 1/10      [facility] [place]   Ph: [phone]  Fax: [phone]  -Sent 1/8  -Left message 1/10  -Left message 1/13     [facility]  Ph: [phone]  Fax: [phone]  -Sent 1/8  -Nicolette velazquez to call back on Monday. DON is checking the price of meds. 1/10  -Left message for Nicolette Brito   Ph: [phone]  Fax: [phone]  -Sent 1/8  -Tiffanie out to do visits at this time 1/10  -Left message for Tiffanie. 1/13.     [facility]   Ph: [phone]  Fax: [phone]  -Sent 1/8

## 2020-01-13 NOTE — PROGRESS NOTES
"INPATIENT PSYCHIATRIC PROGRESS NOTE    Name:  Noe Feliciano  :  1960  MRN:  [id number]  Visit Number:  [id number]  Length of stay:  6    SUBJECTIVE  CC/Focus of Exam: psychosis    INTERVAL HISTORY:  The patient continues to have confusion, and at times bizarre behaviors. He has been incontinent at times and per report has been incontinent with bowel and bladder in his room, and will need a private room.  Depression rating 0/10  Anxiety rating 0/10  Sleep: good  Withdrawal sx: denies  Cravin/10    Review of Systems   Respiratory: Negative.    Cardiovascular: Negative.    Gastrointestinal: Negative.    Psychiatric/Behavioral: Positive for confusion.       OBJECTIVE    Temp:  [98.1 °F (36.7 °C)-98.9 °F (37.2 °C)] 98.1 °F (36.7 °C)  Heart Rate:  [85-93] 85  Resp:  [18] 18  BP: (109-138)/(57-83) 109/57    MENTAL STATUS EXAM:  Appearance:disheveled   Cooperation:Cooperative  Psychomotor: No psychomotor agitation/retardation, No EPS, No motor tics  Speech-pressured.  Mood \"optimistic\"   Affect-congruent, appropriate, stable  Thought Content-appears delusional at times  Thought process-flight of ideas.  Suicidality: No SI  Homicidality: No HI  Perception: No AH/VH  Insight-poor   Judgement-poor    Lab Results (last 24 hours)     ** No results found for the last 24 hours. **             Imaging Results (Last 24 Hours)     ** No results found for the last 24 hours. **             ECG/EMG Results (most recent)     Procedure Component Value Units Date/Time    ECG 12 Lead [[id number]] Collected:  20     Updated:  20    Narrative:       Test Reason : Baseline Cardiac Status  Blood Pressure : **/** mmHG  Vent. Rate : 078 BPM     Atrial Rate : 078 BPM     P-R Int : 180 ms          QRS Dur : 080 ms      QT Int : 392 ms       P-R-T Axes : 078 003 063 degrees     QTc Int : 446 ms    Normal sinus rhythm  Incoplete RBBB  Confirmed by Estela Decker ([id number]) on 2020 7:19:51 PM    Referred By:  " JEFF           Confirmed By:Estela Decker           ALLERGIES: Amoxicillin and Penicillins      Current Facility-Administered Medications:   •  acetaminophen (TYLENOL) tablet 650 mg, 650 mg, Oral, Q6H PRN, Robby Holman MD  •  aluminum-magnesium hydroxide-simethicone (MAALOX MAX) 400-400-40 MG/5ML suspension 15 mL, 15 mL, Oral, Q6H PRN, Robby Holman MD  •  ARIPiprazole (ABILIFY) tablet 5 mg, 5 mg, Oral, Daily, Robby Holman MD, 5 mg at 01/13/20 0904  •  benzonatate (TESSALON) capsule 100 mg, 100 mg, Oral, TID PRN, Robby Holman MD  •  benztropine (COGENTIN) tablet 2 mg, 2 mg, Oral, Once PRN **OR** benztropine (COGENTIN) injection 1 mg, 1 mg, Intramuscular, Once PRN, Robby Holman MD  •  darunavir ethanolate (PREZISTA) tablet 800 mg, 800 mg, Oral, Daily With Breakfast, Oziel Wiggins MD, 800 mg at 01/13/20 0904  •  Igmcpwp-Gaspg-Stzlgrzg-TenofAF (GENVOYA) [phone] MG per tablet 1 tablet, 1 tablet, Oral, Daily With Breakfast, Oziel Wiggins MD, 1 tablet at 01/13/20 0904  •  famotidine (PEPCID) tablet 20 mg, 20 mg, Oral, BID PRN, Robby Holman MD  •  hydrOXYzine (ATARAX) tablet 50 mg, 50 mg, Oral, Q6H PRN, Robby Holman MD, 50 mg at 01/12/20 2036  •  ibuprofen (ADVIL,MOTRIN) tablet 400 mg, 400 mg, Oral, Q6H PRN, Robby Holman MD  •  loperamide (IMODIUM) capsule 2 mg, 2 mg, Oral, Q2H PRN, Robby Holman MD  •  magnesium hydroxide (MILK OF MAGNESIA) suspension 2400 mg/10mL 10 mL, 10 mL, Oral, Daily PRN, Robby Holman MD  •  multivitamin (DAILY MERARI) tablet 1 tablet, 1 tablet, Oral, Daily, Oziel Wiggins MD, 1 tablet at 01/13/20 0904  •  nitrofurantoin (macrocrystal-monohydrate) (MACROBID) capsule 100 mg, 100 mg, Oral, BID, Robby Holman MD, 100 mg at 01/13/20 0904  •  ondansetron (ZOFRAN) tablet 4 mg, 4 mg, Oral, Q6H PRN, Robby Holman MD  •  sodium chloride nasal spray 2 spray, 2 spray, Each Nare, PRJANNETTE, Robby Holman MD  •  traZODone (DESYREL) tablet 50 mg,  50 mg, Oral, Nightly PRN, Robby Holman MD, 50 mg at 01/12/20 2036    ASSESSMENT & PLAN:      Psychosis (CMS/[facility])  - Continue Abilify. 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       HIV (human immunodeficiency virus infection) (CMS/[facility])  - Continue home meds      Tobacco use disorder  - Nicoderm pathc      UTI (urinary tract infection)  - Macrobid    Suicide precautions: Suicide precaution Level 3 (q15 min checks)     Behavioral Health Treatment Plan and Problem List: I have reviewed and approved the Behavioral Health Treatment Plan and Problem list.  The patient has had a chance to review and agrees with the treatment plan.     Clinician:  Oziel Wiggins MD  01/13/20  10:20 AM

## 2020-01-13 NOTE — PLAN OF CARE
PT DENIES ANY ANXIETY, DEPRESSION, SI, HI, OR AVH. PT CONTINUES TO BE HYPERVERBAL, FLIGHT OF IDEAS.

## 2020-01-13 NOTE — PLAN OF CARE
Problem: Patient Care Overview  Goal: Interprofessional Rounds/Family Conf  Outcome: Ongoing (interventions implemented as appropriate)  Flowsheets (Taken 1/10/2020 1716 by Flor Rosario)  Participants: milieu/psych techs;nursing;psychiatrist;social work;other (see comments) (Navigator.)  Summary: Treatment team staffing.    1330:     Covering therapist assisting patient and guardian with discharge planning this date.  Navigator Ayesha verbalized that she has attempted several phone calls this date without success.  Therapist returned phone call to patient's guardian Lydia Jen LOPEZ at [phone]; Guardian was contacted this date.  Guardian gave verbal consent for patients's family members, Demetris and James Feliciano, to be able to contact patient and call unit.  Therapist updated MHT Karen this date.  Guardian denied other concerns.  Treatment team will continue follow up on placement referrals. Therapist offered emotional support this date. Patient continues to be bizarre at times.  No major issues identified.

## 2020-01-14 PROCEDURE — 99232 SBSQ HOSP IP/OBS MODERATE 35: CPT | Performed by: PSYCHIATRY & NEUROLOGY

## 2020-01-14 RX ORDER — DONEPEZIL HYDROCHLORIDE 5 MG/1
5 TABLET, FILM COATED ORAL NIGHTLY
Status: DISCONTINUED | OUTPATIENT
Start: 2020-01-14 | End: 2020-01-17 | Stop reason: HOSPADM

## 2020-01-14 RX ADMIN — Medication 1 TABLET: at 08:13

## 2020-01-14 RX ADMIN — TRAZODONE HYDROCHLORIDE 50 MG: 50 TABLET ORAL at 21:00

## 2020-01-14 RX ADMIN — ARIPIPRAZOLE 5 MG: 10 TABLET ORAL at 08:13

## 2020-01-14 RX ADMIN — DONEPEZIL HYDROCHLORIDE 5 MG: 5 TABLET, FILM COATED ORAL at 21:00

## 2020-01-14 RX ADMIN — NITROFURANTOIN MONOHYDRATE/MACROCRYSTALLINE 100 MG: 25; 75 CAPSULE ORAL at 08:13

## 2020-01-14 RX ADMIN — Medication 800 MG: at 08:13

## 2020-01-14 NOTE — PLAN OF CARE
"PT RATED ANXIETY 8, DEPRESSION 10 \"BC MY LITTLE PUPPY IS HOME BY ITSELF\", DENIED SI/HI/HALLUCINATIONS, RESTLESS THIS SHIFT BUT COOPERATIVE.  "

## 2020-01-14 NOTE — PLAN OF CARE
Problem: Patient Care Overview  Goal: Interprofessional Rounds/Family Conf  Flowsheets (Taken 1/14/2020 1442)  Participants: nursing; social work; psychiatrist; other (see comments) (Navigator.)  Summary: Treatment team staffing.     Problem: Overarching Goals (Adult)  Goal: Optimized Coping Skills in Response to Life Stressors  Intervention: Promote Effective Coping Strategies  Flowsheets (Taken 1/14/2020 1442)  Supportive Measures: active listening utilized; counseling provided; verbalization of feelings encouraged; relaxation techniques promoted       DATA:         Therapist met individually with patient this date for inpatient follow up.  Continued to discuss progress with treatment.  Therapist reviewed patient's chart and provided education on resources for aftercare.  Discussed disposition planning.  Patient appeared confused and child-like with garbled speech.  Staff report that he has displayed hygiene issues on the unit recently.  Patient has not had any behavioral issues.  He continues stabilization.     Clinical Maneuvering/Intervention:     Therapist assisted patient in processing above session content; acknowledged and normalized patient’s thoughts, feelings, and concerns.  Discussed the therapist/patient relationship and explain the parameters and limitations of relative confidentiality.  Also discussed the importance active participation, and honesty to the treatment process.  Encouraged the patient to discuss/vent her feelings, frustrations, and fears concerning ongoing medical issues and validated patient's feelings.     Discussed the importance of finding enjoyable activities and coping skills that the patient can engage in a regular basis. Discussed healthy coping skills such as distraction, self love, grounding, thought challenges/reframing, etc.  Provided patient with list of healthy coping skills this date. Discussed the importance of medication compliance.       Allowed patient to freely  discuss issues without interruption or judgment. Provided safe, confidential environment to facilitate the development of positive therapeutic relationship and encourage open, honest communication.       ASSESSMENT:      Patient appeared to display restricted affect and depressed mood.  He denied SI, HI, and AVH.  He seemed confused with limited insight.       PLAN:       Patient to remain hospitalized this date.  Patient awaiting admissions update from nursing home referrals to [facility] and [facility].

## 2020-01-14 NOTE — PROGRESS NOTES
Navigator is helping Primary Therapist with discharge planning for patient. Navigator completed the following referrals on this day:     [facility]   Ph: [phone]  Fax: [phone]  -Sent 1/8  -No Beds Available 1/10      [facility]   Ph: [phone]  Fax: [phone]  -Sent 1/8  -Left message 1/10  -Left message 1/13  -Declined 1/13     [facility]  Ph: [phone]  Fax: [phone]  -Sent 1/8  -Nicolette states to call back on Monday. DON is checking the price of meds. 1/10  -Left message for Nicoeltte Esqueda in court today. No one else available to speak with about referrals. 1/14.     [facility]   Ph: [phone]  Fax: [phone]  -Sent 1/8  -Tiffanie out to do visits at this time 1/10  -Left message for Tiffanie. 1/13.  -Per Therapist Flor patient declined. 1/14     [facility]   Ph: [phone]  Fax: [phone]  -Sent 1/8

## 2020-01-14 NOTE — PROGRESS NOTES
"INPATIENT PSYCHIATRIC PROGRESS NOTE    Name:  Noe Feliciano  :  1960  MRN:  [id number]  Visit Number:  [id number]  Length of stay:  7    SUBJECTIVE  CC/Focus of Exam: psychosis    INTERVAL HISTORY:  The patient states he is worried about his puppy at home and he is afraid that the puppy is missing him and is grieving. The patient continues to be confused and acts inappropriate at times with personal hygiene issues.    Depression rating 0/10  Anxiety rating 0/10  Sleep: good  Withdrawal sx: denies  Cravin/10    Review of Systems   Respiratory: Negative.    Cardiovascular: Negative.    Gastrointestinal: Negative.    Psychiatric/Behavioral: Positive for confusion.       OBJECTIVE    Temp:  [99 °F (37.2 °C)] 99 °F (37.2 °C)  Heart Rate:  [90-92] 90  Resp:  [18] 18  BP: (126-135)/(80-84) 126/80    MENTAL STATUS EXAM:  Appearance:disheveled   Cooperation:Cooperative  Psychomotor: No psychomotor agitation/retardation, No EPS, No motor tics  Speech-pressured.  Mood \"good\"   Affect-congruent, appropriate, stable  Thought Content-appears delusional at times  Thought process-flight of ideas.  Suicidality: No SI  Homicidality: No HI  Perception: No AH/VH  Insight-poor   Judgement-poor    Lab Results (last 24 hours)     ** No results found for the last 24 hours. **             Imaging Results (Last 24 Hours)     ** No results found for the last 24 hours. **             ECG/EMG Results (most recent)     Procedure Component Value Units Date/Time    ECG 12 Lead [[id number]] Collected:  20     Updated:  20    Narrative:       Test Reason : Baseline Cardiac Status  Blood Pressure : **/** mmHG  Vent. Rate : 078 BPM     Atrial Rate : 078 BPM     P-R Int : 180 ms          QRS Dur : 080 ms      QT Int : 392 ms       P-R-T Axes : 078 003 063 degrees     QTc Int : 446 ms    Normal sinus rhythm  Incoplete RBBB  Confirmed by Estela Decker ([id number]) on 2020 7:19:51 PM    Referred By:  JEFF" Confirmed By:Estela Decker           ALLERGIES: Amoxicillin and Penicillins      Current Facility-Administered Medications:   •  acetaminophen (TYLENOL) tablet 650 mg, 650 mg, Oral, Q6H PRN, Robby Holman MD  •  aluminum-magnesium hydroxide-simethicone (MAALOX MAX) 400-400-40 MG/5ML suspension 15 mL, 15 mL, Oral, Q6H PRN, Robby Holman MD  •  ARIPiprazole (ABILIFY) tablet 5 mg, 5 mg, Oral, Daily, Robby Holman MD, 5 mg at 01/14/20 0813  •  benzonatate (TESSALON) capsule 100 mg, 100 mg, Oral, TID PRN, Robby Holman MD  •  benztropine (COGENTIN) tablet 2 mg, 2 mg, Oral, Once PRN **OR** benztropine (COGENTIN) injection 1 mg, 1 mg, Intramuscular, Once PRN, Robby Holman MD  •  darunavir ethanolate (PREZISTA) tablet 800 mg, 800 mg, Oral, Daily With Breakfast, Oziel Wiggins MD, 800 mg at 01/14/20 0813  •  Wvqnnnh-Iwinj-Jlmvyxtb-TenofAF (GENVOYA) [phone] MG per tablet 1 tablet, 1 tablet, Oral, Daily With Breakfast, Oziel Wiggins MD, 1 tablet at 01/14/20 0813  •  famotidine (PEPCID) tablet 20 mg, 20 mg, Oral, BID PRN, Robby Holman MD  •  hydrOXYzine (ATARAX) tablet 50 mg, 50 mg, Oral, Q6H PRN, Robby Holman MD, 50 mg at 01/12/20 2036  •  ibuprofen (ADVIL,MOTRIN) tablet 400 mg, 400 mg, Oral, Q6H PRN, Robby Holman MD  •  loperamide (IMODIUM) capsule 2 mg, 2 mg, Oral, Q2H PRN, Robby Holman MD  •  magnesium hydroxide (MILK OF MAGNESIA) suspension 2400 mg/10mL 10 mL, 10 mL, Oral, Daily PRN, Robby Holman MD  •  multivitamin (DAILY MERARI) tablet 1 tablet, 1 tablet, Oral, Daily, Oziel Wiggins MD, 1 tablet at 01/14/20 0813  •  nitrofurantoin (macrocrystal-monohydrate) (MACROBID) capsule 100 mg, 100 mg, Oral, BID, Robby Holman MD, 100 mg at 01/14/20 0813  •  ondansetron (ZOFRAN) tablet 4 mg, 4 mg, Oral, Q6H PRN, Robby Holman MD  •  sodium chloride nasal spray 2 spray, 2 spray, Each Nare, PRN, Robby Holman MD  •  traZODone (DESYREL) tablet 50 mg, 50 mg, Oral,  Manda Valentino Brian T, MD, 50 mg at 01/13/20 2021    ASSESSMENT & PLAN:      Psychosis (CMS/[facility])  - Continue Abilify. 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   - Add Aricept      HIV (human immunodeficiency virus infection) (CMS/[facility])  - Continue home meds      Tobacco use disorder  - Nicoderm pathc      UTI (urinary tract infection)  - Macrobid    Suicide precautions: Suicide precaution Level 3 (q15 min checks)     Behavioral Health Treatment Plan and Problem List: I have reviewed and approved the Behavioral Health Treatment Plan and Problem list.  The patient has had a chance to review and agrees with the treatment plan.     Clinician:  Oziel Wiggins MD  01/14/20  10:29 AM

## 2020-01-14 NOTE — PLAN OF CARE
PT DENIES ANY ANXIETY, DEPRESSION, SI, HI, OR AVH. PT CONTINUES TO BE HYPERVERBAL, FLIGHT OF IDEAS, AND VERY DISORGANIZED. PT HAS BEEN HAVING INCONTINENCE ISSUES.

## 2020-01-15 PROCEDURE — 99232 SBSQ HOSP IP/OBS MODERATE 35: CPT | Performed by: PSYCHIATRY & NEUROLOGY

## 2020-01-15 RX ADMIN — HYDROXYZINE HYDROCHLORIDE 50 MG: 50 TABLET ORAL at 20:40

## 2020-01-15 RX ADMIN — Medication 1 TABLET: at 10:14

## 2020-01-15 RX ADMIN — ARIPIPRAZOLE 5 MG: 10 TABLET ORAL at 10:14

## 2020-01-15 RX ADMIN — TRAZODONE HYDROCHLORIDE 50 MG: 50 TABLET ORAL at 20:40

## 2020-01-15 RX ADMIN — LOPERAMIDE HYDROCHLORIDE 2 MG: 2 CAPSULE ORAL at 06:23

## 2020-01-15 RX ADMIN — DONEPEZIL HYDROCHLORIDE 5 MG: 5 TABLET, FILM COATED ORAL at 20:40

## 2020-01-15 RX ADMIN — Medication 800 MG: at 10:15

## 2020-01-15 NOTE — PLAN OF CARE
PT DENIED ANXIETY/DEPRESSION/SI/HI/HALLUCINATIONS, STILL HAVING FOI, RESTLESS, DISORGANIZED THOUGH.

## 2020-01-15 NOTE — PROGRESS NOTES
Navigator is helping Primary Therapist with discharge planning for patient. Navigator completed the following referrals on this day:     [facility]   Ph: [phone]  Fax: [phone]  -Sent 1/8  -No Beds Available 1/10      [facility]   Ph: [phone]  Fax: [phone]  -Sent 1/8  -Left message 1/10  -Left message 1/13  -Declined 1/13     [facility]  Ph: [phone]  Fax: [phone]  -Sent 1/8  -Nicolette states to call back on Monday. DON is checking the price of meds. 1/10  -Left message for Nicolette Esqueda in court today. No one else available to speak with about referrals. 1/14.  -Left message for Nicolette 1/15     [facility]   Ph: [phone]  Fax: [phone]  -Sent 1/8  -Tiffanie out to do visits at this time 1/10  -Left message for Tiffanie. 1/13.  -Per Therapist Flor patient declined. 1/14     [facility]   Ph: [phone]  Fax: [phone]  -Sent 1/8

## 2020-01-15 NOTE — PLAN OF CARE
Pt.denies any anxiety rates depression 9 he verbalizes feeling hopeless he denies any s/I denies h/I pt. Incontinence of stool pt. Denies any a/v hallucinations pt. Talkative with peers & staff .PT. Has been incontinent of stool 4 times today . Pt.  Care given  with cleaning .

## 2020-01-15 NOTE — PROGRESS NOTES
"INPATIENT PSYCHIATRIC PROGRESS NOTE    Name:  Noe Feliciano  :  1960  MRN:  [id number]  Visit Number:  [id number]  Length of stay:  8    SUBJECTIVE  CC/Focus of Exam: psychosis    INTERVAL HISTORY:  The patient states he is feeling good. He continues to be confused and at times exhibits inappropriate behaviors, a function of his underlying dementia.    Depression rating 0/10  Anxiety rating 0/10  Sleep: good  Withdrawal sx: denies  Cravin/10    Review of Systems   Respiratory: Negative.    Cardiovascular: Negative.    Gastrointestinal: Negative.    Psychiatric/Behavioral: Positive for confusion.       OBJECTIVE    Temp:  [98.3 °F (36.8 °C)-98.5 °F (36.9 °C)] 98.3 °F (36.8 °C)  Heart Rate:  [88-91] 88  Resp:  [18] 18  BP: (104-116)/(62-67) 116/67    MENTAL STATUS EXAM:  Appearance:disheveled   Cooperation:Cooperative  Psychomotor: No psychomotor agitation/retardation, No EPS, No motor tics  Speech-pressured.  Mood \"good\"   Affect-congruent, appropriate, stable  Thought Content-appears delusional at times  Thought process-flight of ideas.  Suicidality: No SI  Homicidality: No HI  Perception: No AH/VH  Insight-poor   Judgement-poor    Lab Results (last 24 hours)     ** No results found for the last 24 hours. **             Imaging Results (Last 24 Hours)     ** No results found for the last 24 hours. **             ECG/EMG Results (most recent)     Procedure Component Value Units Date/Time    ECG 12 Lead [[id number]] Collected:  20     Updated:  20    Narrative:       Test Reason : Baseline Cardiac Status  Blood Pressure : **/** mmHG  Vent. Rate : 078 BPM     Atrial Rate : 078 BPM     P-R Int : 180 ms          QRS Dur : 080 ms      QT Int : 392 ms       P-R-T Axes : 078 003 063 degrees     QTc Int : 446 ms    Normal sinus rhythm  Incoplete RBBB  Confirmed by Estela Decker ([id number]) on 2020 7:19:51 PM    Referred By:  JEFF           Confirmed By:Estela Decker       "     ALLERGIES: Amoxicillin and Penicillins      Current Facility-Administered Medications:   •  acetaminophen (TYLENOL) tablet 650 mg, 650 mg, Oral, Q6H PRN, Robby Holman MD  •  aluminum-magnesium hydroxide-simethicone (MAALOX MAX) 400-400-40 MG/5ML suspension 15 mL, 15 mL, Oral, Q6H PRN, Robby Holman MD  •  ARIPiprazole (ABILIFY) tablet 5 mg, 5 mg, Oral, Daily, Robby Holman MD, 5 mg at 01/14/20 0813  •  benzonatate (TESSALON) capsule 100 mg, 100 mg, Oral, TID PRN, Robby Holman MD  •  benztropine (COGENTIN) tablet 2 mg, 2 mg, Oral, Once PRN **OR** benztropine (COGENTIN) injection 1 mg, 1 mg, Intramuscular, Once PRN, Robby Holman MD  •  darunavir ethanolate (PREZISTA) tablet 800 mg, 800 mg, Oral, Daily With Breakfast, Oziel Wiggins MD, 800 mg at 01/14/20 0813  •  donepezil (ARICEPT) tablet 5 mg, 5 mg, Oral, Nightly, Oziel Wiggins MD, 5 mg at 01/14/20 2100  •  Arhksou-Bdzhc-Qiqqarkh-TenofAF (GENVOYA) [phone] MG per tablet 1 tablet, 1 tablet, Oral, Daily With Breakfast, Oziel Wiggins MD, 1 tablet at 01/14/20 0813  •  famotidine (PEPCID) tablet 20 mg, 20 mg, Oral, BID PRN, Robby Holman MD  •  hydrOXYzine (ATARAX) tablet 50 mg, 50 mg, Oral, Q6H PRN, Robby Holman MD, 50 mg at 01/12/20 2036  •  ibuprofen (ADVIL,MOTRIN) tablet 400 mg, 400 mg, Oral, Q6H PRN, Robby Holman MD  •  loperamide (IMODIUM) capsule 2 mg, 2 mg, Oral, Q2H PRN, Robby Holman MD, 2 mg at 01/15/20 0623  •  magnesium hydroxide (MILK OF MAGNESIA) suspension 2400 mg/10mL 10 mL, 10 mL, Oral, Daily PRN, Robby Holman MD  •  multivitamin (DAILY MERARI) tablet 1 tablet, 1 tablet, Oral, Daily, Oziel Wiggins MD, 1 tablet at 01/14/20 0813  •  ondansetron (ZOFRAN) tablet 4 mg, 4 mg, Oral, Q6H PRN, Robby Holman MD  •  sodium chloride nasal spray 2 spray, 2 spray, Each Nare, PRN, Robby Holman MD  •  traZODone (DESYREL) tablet 50 mg, 50 mg, Oral, Nightly PRN, Robby Holman MD, 50 mg at 01/14/20  2100    ASSESSMENT & PLAN:      Psychosis (CMS/[facility])  - Continue Abilify. 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   - Added Aricept      HIV (human immunodeficiency virus infection) (CMS/[facility])  - Continue home meds      Tobacco use disorder  - Nicoderm patch      UTI (urinary tract infection)  - Macrobid    Suicide precautions: Suicide precaution Level 3 (q15 min checks)     Behavioral Health Treatment Plan and Problem List: I have reviewed and approved the Behavioral Health Treatment Plan and Problem list.  The patient has had a chance to review and agrees with the treatment plan.     Clinician:  Oziel Wiggins MD  01/15/20  10:10 AM

## 2020-01-15 NOTE — PLAN OF CARE
"  Problem: Patient Care Overview  Goal: Interprofessional Rounds/Family Conf  Flowsheets (Taken 1/15/2020 0502)  Participants: psychiatrist; nursing; milieu/psych techs; social work; other (see comments) (Navigator)  Summary: Treatment team staffing.     DATA:         Therapist met individually with patient this date for inpatient follow up.  Continued to discuss progress with treatment.  Therapist reviewed patient's chart and provided education on resources for aftercare.  Discussed disposition planning.  Patient displayed rambling speech and appeared anxious.  He is noted to have incontinence at times.  Patient appeared pleasant and interacts with peers appropriately.  He appeared somewhat confused.  Patient continues stabilization.     Clinical Maneuvering/Intervention:     Therapist assisted patient in processing above session content; acknowledged and normalized patient’s thoughts, feelings, and concerns.  Discussed the therapist/patient relationship and explain the parameters and limitations of relative confidentiality.  Also discussed the importance active participation, and honesty to the treatment process.  Encouraged the patient to discuss/vent her feelings, frustrations, and fears concerning ongoing medical issues and validated patient's feelings.     Discussed the importance of finding enjoyable activities and coping skills that the patient can engage in a regular basis. Discussed healthy coping skills such as distraction, self love, grounding, thought challenges/reframing, etc.  Provided patient with list of healthy coping skills this date. Discussed the importance of medication compliance.       Allowed patient to freely discuss issues without interruption or judgment. Provided safe, confidential environment to facilitate the development of positive therapeutic relationship and encourage open, honest communication.       ASSESSMENT:      Patient appeared to display appropriate affect and \"good\" mood.  " Patient denied SI, HI, and AVH.  He reported feeling anxious and somewhat hopeless.      PLAN:       Patient to remain hospitalized this date.  Patient waiting update from nursing home referrals.

## 2020-01-16 PROCEDURE — 99232 SBSQ HOSP IP/OBS MODERATE 35: CPT | Performed by: PSYCHIATRY & NEUROLOGY

## 2020-01-16 RX ADMIN — ARIPIPRAZOLE 5 MG: 10 TABLET ORAL at 09:59

## 2020-01-16 RX ADMIN — Medication 1 TABLET: at 09:59

## 2020-01-16 RX ADMIN — Medication 1 TABLET: at 09:57

## 2020-01-16 RX ADMIN — DONEPEZIL HYDROCHLORIDE 5 MG: 5 TABLET, FILM COATED ORAL at 20:01

## 2020-01-16 RX ADMIN — TRAZODONE HYDROCHLORIDE 50 MG: 50 TABLET ORAL at 20:01

## 2020-01-16 RX ADMIN — Medication 800 MG: at 09:56

## 2020-01-16 RX ADMIN — HYDROXYZINE HYDROCHLORIDE 50 MG: 50 TABLET ORAL at 20:01

## 2020-01-16 NOTE — PROGRESS NOTES
"INPATIENT PSYCHIATRIC PROGRESS NOTE    Name:  Noe Feliciano  :  1960  MRN:  [id number]  Visit Number:  [id number]  Length of stay:  9    SUBJECTIVE  CC/Focus of Exam: psychosis    INTERVAL HISTORY:  The patient is confused but cooperative and pleasant. He was started on Abilify but has not shown much improvement on it. Will discontinue it at this time. He will be continued on Aricept.    Depression rating 0/10  Anxiety rating 0/10  Sleep: good  Withdrawal sx: denies  Cravin/10    Review of Systems   Respiratory: Negative.    Cardiovascular: Negative.    Gastrointestinal: Negative.    Psychiatric/Behavioral: Positive for confusion.       OBJECTIVE    Temp:  [98.8 °F (37.1 °C)-99.2 °F (37.3 °C)] 98.8 °F (37.1 °C)  Heart Rate:  [87-93] 87  Resp:  [18] 18  BP: (107-139)/(54-76) 107/54    MENTAL STATUS EXAM:  Appearance:disheveled   Cooperation:Cooperative  Psychomotor: No psychomotor agitation/retardation, No EPS, No motor tics  Speech-pressured.  Mood \"good\"   Affect-congruent, appropriate, stable  Thought Content-appears delusional at times  Thought process-flight of ideas.  Suicidality: No SI  Homicidality: No HI  Perception: No AH/VH  Insight-poor   Judgement-poor    Lab Results (last 24 hours)     ** No results found for the last 24 hours. **             Imaging Results (Last 24 Hours)     ** No results found for the last 24 hours. **             ECG/EMG Results (most recent)     Procedure Component Value Units Date/Time    ECG 12 Lead [[id number]] Collected:  20     Updated:  20    Narrative:       Test Reason : Baseline Cardiac Status  Blood Pressure : **/** mmHG  Vent. Rate : 078 BPM     Atrial Rate : 078 BPM     P-R Int : 180 ms          QRS Dur : 080 ms      QT Int : 392 ms       P-R-T Axes : 078 003 063 degrees     QTc Int : 446 ms    Normal sinus rhythm  Incoplete RBBB  Confirmed by Estela Decker ([id number]) on 2020 7:19:51 PM    Referred By:  JEFF           Confirmed " By:Estela Decker           ALLERGIES: Amoxicillin and Penicillins      Current Facility-Administered Medications:   •  acetaminophen (TYLENOL) tablet 650 mg, 650 mg, Oral, Q6H PRN, Robby Holman MD  •  aluminum-magnesium hydroxide-simethicone (MAALOX MAX) 400-400-40 MG/5ML suspension 15 mL, 15 mL, Oral, Q6H PRN, Robby Holman MD  •  ARIPiprazole (ABILIFY) tablet 5 mg, 5 mg, Oral, Daily, Robby Holman MD, 5 mg at 01/16/20 0959  •  benzonatate (TESSALON) capsule 100 mg, 100 mg, Oral, TID PRN, Robby Holman MD  •  benztropine (COGENTIN) tablet 2 mg, 2 mg, Oral, Once PRN **OR** benztropine (COGENTIN) injection 1 mg, 1 mg, Intramuscular, Once PRN, Robby Holman MD  •  darunavir ethanolate (PREZISTA) tablet 800 mg, 800 mg, Oral, Daily With Breakfast, Oziel Wiggins MD, 800 mg at 01/16/20 0956  •  donepezil (ARICEPT) tablet 5 mg, 5 mg, Oral, Nightly, Oziel Wiggins MD, 5 mg at 01/15/20 2040  •  Brxhpcu-Irxkk-Xjytpqrt-TenofAF (GENVOYA) [phone] MG per tablet 1 tablet, 1 tablet, Oral, Daily With Breakfast, Oziel Wiggins MD, 1 tablet at 01/16/20 0957  •  famotidine (PEPCID) tablet 20 mg, 20 mg, Oral, BID PRN, Robby Holman MD  •  hydrOXYzine (ATARAX) tablet 50 mg, 50 mg, Oral, Q6H PRN, Robby Holman MD, 50 mg at 01/15/20 2040  •  ibuprofen (ADVIL,MOTRIN) tablet 400 mg, 400 mg, Oral, Q6H PRN, Robby Holman MD  •  loperamide (IMODIUM) capsule 2 mg, 2 mg, Oral, Q2H PRN, Robby Holman MD, 2 mg at 01/15/20 0623  •  magnesium hydroxide (MILK OF MAGNESIA) suspension 2400 mg/10mL 10 mL, 10 mL, Oral, Daily PRN, Robby Holman MD  •  multivitamin (DAILY MERARI) tablet 1 tablet, 1 tablet, Oral, Daily, Oziel Wiggins MD, 1 tablet at 01/16/20 0959  •  ondansetron (ZOFRAN) tablet 4 mg, 4 mg, Oral, Q6H PRN, Robby Holman MD  •  sodium chloride nasal spray 2 spray, 2 spray, Each Nare, PRN, Robby Holman MD  •  traZODone (DESYREL) tablet 50 mg, 50 mg, Oral, Nightly PRN, Robby Holman,  MD, 50 mg at 01/15/20 2040    ASSESSMENT & PLAN:      Psychosis (CMS/[facility])  - Discontinue Abilify, as patient has not really benefited from the medication.. 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   - Added Aricept      HIV (human immunodeficiency virus infection) (CMS/[facility])  - Continue home meds      Tobacco use disorder  - Nicoderm patch      UTI (urinary tract infection)  - Macrobid    Suicide precautions: Suicide precaution Level 3 (q15 min checks)     Behavioral Health Treatment Plan and Problem List: I have reviewed and approved the Behavioral Health Treatment Plan and Problem list.  The patient has had a chance to review and agrees with the treatment plan.     Clinician:  Oziel Wiggins MD  01/16/20  10:28 AM

## 2020-01-16 NOTE — PLAN OF CARE
Problem: Patient Care Overview  Goal: Plan of Care Review  Outcome: Ongoing (interventions implemented as appropriate)  Flowsheets (Taken 1/16/2020 2397)  Progress: improving  Plan of Care Reviewed With: patient  Patient Agreement with Plan of Care: agrees  Outcome Summary: Pt calm and cooperative. Rates anxiety 8. Denies anxiety, SI, HI or AVH. Report sleep and appetite as good.

## 2020-01-16 NOTE — PLAN OF CARE
Problem: Patient Care Overview  Goal: Plan of Care Review  Outcome: Ongoing (interventions implemented as appropriate)  Flowsheets (Taken 1/16/2020 8413)  Progress: no change  Plan of Care Reviewed With: patient  Patient Agreement with Plan of Care: agrees  Note:   Quiet day. Up and about.

## 2020-01-16 NOTE — PLAN OF CARE
Problem: Patient Care Overview  Goal: Interprofessional Rounds/Family Conf  Flowsheets (Taken 1/16/2020 1731)  Participants: psychiatrist; psychologist; social work; milieu/psych techs; nursing; family  Summary: Treatment team staffing.     Problem: Overarching Goals (Adult)  Goal: Optimized Coping Skills in Response to Life Stressors  Intervention: Promote Effective Coping Strategies  Flowsheets (Taken 1/16/2020 1731)  Supportive Measures: counseling provided; active listening utilized; verbalization of feelings encouraged     DATA:         Therapist met individually with patient this date for inpatient follow up.  Continued to discuss progress with treatment.  Therapist reviewed patient's chart and provided education on resources for aftercare.  Discussed disposition planning.  Patient appeared cooperative and pleasant.  He displayed rambling, garbled speech.  He reported feeling better today and in good mood.      Therapist spoke with [facility] and informed that patient has been declined due to being unable to meet his needs.  Therapist spoke with patient's state guardian Lydiajacinta Li.  Provided update and reviewed that multiple nursing home referrals have been exhausted and patient has been declined.  Lydia stated she is agreeable for patient to attend personal care with [facility].  Therapist spoke with Suzanna at [facility] who reported they are able to accept patient tomorrow for admission.  She reported she would fax note for bed letter.  She stated they do not assist with transportation.  Therapist updated Lydia, who reported she is agreeable for patient to attend placement tomorrow.    Therapist spoke with patient's father Eliezer via phone.  He reported being agreeable for patient to attend [facility] and stated that he would have his other son bring patient clothes and patient's home meds late this evening when he gets off work.  Demetris  "reported that patient has 10 tablets of each of his home meds.  He reported that he cannot transport patient and that his son does not get off work until 6 tomorrow.  He appeared supportive of patient's treatment and denied concerns.    Therapist met again with patient for update.  He reported being agreeable to attend [facility] and looking forward to meeting new people.  Patient denied concerns.  He continues hospitalization.     Clinical Maneuvering/Intervention:     Therapist assisted patient in processing above session content; acknowledged and normalized patient’s thoughts, feelings, and concerns.  Discussed the therapist/patient relationship and explain the parameters and limitations of relative confidentiality.  Also discussed the importance active participation, and honesty to the treatment process.  Encouraged the patient to discuss/vent her feelings, frustrations, and fears concerning ongoing medical issues and validated patient's feelings.     Discussed the importance of finding enjoyable activities and coping skills that the patient can engage in a regular basis. Discussed healthy coping skills such as distraction, self love, grounding, thought challenges/reframing, etc.  Provided patient with list of healthy coping skills this date. Discussed the importance of medication compliance.       Allowed patient to freely discuss issues without interruption or judgment. Provided safe, confidential environment to facilitate the development of positive therapeutic relationship and encourage open, honest communication.       ASSESSMENT:      Patient appeared to display appropriate affect and \"good\" mood.  Patient denied SI, HI, and AVH.  He appeared oriented x3 with limited insight.  Patient confused at times on unit.       PLAN:       Patient to remain hospitalized this date.  Patient accepted to [facility] tomorrow and will need assistance with transportation.    "

## 2020-01-17 VITALS
TEMPERATURE: 98 F | DIASTOLIC BLOOD PRESSURE: 67 MMHG | HEART RATE: 90 BPM | OXYGEN SATURATION: 96 % | HEIGHT: 65 IN | BODY MASS INDEX: 24.36 KG/M2 | SYSTOLIC BLOOD PRESSURE: 109 MMHG | RESPIRATION RATE: 16 BRPM | WEIGHT: 146.2 LBS

## 2020-01-17 PROCEDURE — 99238 HOSP IP/OBS DSCHRG MGMT 30/<: CPT | Performed by: PSYCHIATRY & NEUROLOGY

## 2020-01-17 RX ORDER — DONEPEZIL HYDROCHLORIDE 5 MG/1
5 TABLET, FILM COATED ORAL NIGHTLY
Qty: 30 TABLET | Refills: 0 | Status: SHIPPED | OUTPATIENT
Start: 2020-01-17

## 2020-01-17 RX ADMIN — Medication 1 TABLET: at 08:38

## 2020-01-17 RX ADMIN — Medication 800 MG: at 08:39

## 2020-01-17 RX ADMIN — ARIPIPRAZOLE 5 MG: 10 TABLET ORAL at 08:38

## 2020-01-17 RX ADMIN — LOPERAMIDE HYDROCHLORIDE 2 MG: 2 CAPSULE ORAL at 11:32

## 2020-01-17 RX ADMIN — Medication 1 TABLET: at 08:39

## 2020-01-17 RX ADMIN — LOPERAMIDE HYDROCHLORIDE 2 MG: 2 CAPSULE ORAL at 09:12

## 2020-01-17 NOTE — DISCHARGE PLACEMENT REQUEST
"Kathy Feliciano (59 y.o. Male)     Date of Birth Social Security Number Address Home Phone MRN    1960  [address] [place], [place] [address] [phone] [id number]    Catholic Marital Status          Cheondoism Single       Admission Date Admission Type Admitting Provider Attending Provider Department, Room/Bed    20 Emergency Robby Holman MD Briscoe, Brian T, MD [facility] ADULT PSYCHIATRIC, 1019/1S    Discharge Date Discharge Disposition Discharge Destination         Home or Self Care              Attending Provider:  Robby Holman MD    Allergies:  Amoxicillin, Penicillins    Isolation:  None   Infection:  None   Code Status:  CPR    Ht:  165.1 cm (65\")   Wt:  66.3 kg (146 lb 3.2 oz)    Admission Cmt:  None   Principal Problem:  Psychosis (CMS/[facility]) [F29]                 Active Insurance as of 2020     Primary Coverage     Payor Plan Insurance Group Employer/Plan Group    MEDICARE MEDICARE A & B      Payor Plan Address Payor Plan Phone Number Payor Plan Fax Number Effective Dates    [address] [phone]  2008 - None Entered    [place] [address]       Subscriber Name Subscriber Birth Date Member ID       KATHY FELICIANO 1960 [id number]                 Emergency Contacts      (Rel.) Home Phone Work Phone Mobile Phone    [facility] sisAlla cordon (Guardian) -- [phone] --    Eliezer Feliciano (Father) [phone] -- --               History & Physical      Oziel Wiggins MD at 20 1035          INITIAL PSYCHIATRIC HISTORY & PHYSICAL    Patient Identification:  Name:   Kathy Feliciano  Age:   59 y.o.  Sex:   male  :   1960  MRN:   [id number]  Visit Number:   [id number]  Primary Care Physician:   Aleksandra Hudson MD    SUBJECTIVE    CC/Focus of Exam: Psychosis    HPI: This is the first [facility] admission for Kathy Feliciano who is a 59 y.o. Cheondoism, college educated, retired from [employer] male who was admitted on 2020 " "with complaints of not completing his ADLs, wandering around even in the [place], and looking into neighbors windows.  The patient has no memory of this.  The family reported that the patient is not safe in his home, and he is unable to care for himself anymore.  They are requesting nursing home placement for the patient.    The patient is able to state his name and where he is at, but states he does not know why he is here.The patient says the season is spring, but did know that Jose is the .  Then goes on to say, that he has been in Jose's house and [facility] [place]. 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  The sister corroborates this.     It is hard to elicit much reliable information from the patient.  He is rambling and mumbling with pressured speech during interview.  He is hard to understand.  Patient jumps from one topic to another.  An example of this is wanting to talk about an episode of the Marcelo Leno show, and then jump to talking about his puppy.  The patient's family had reported that his house was in disarray, but patient reports that \"I am proud of my house and the floors are shiny\".  Patient states the brother pays his bills out of his residential of $1357 per month.  Also states the family takes care of his medication.  Patient denies any depression or auditory or visual hallucinations. He obtained a double bachelor degrees and a master's degree.  States he was in [place] studying international relations.  Then states he came back to the [employer] to work at [employer], \"running the [employer]\".    Asked the patient about his HIV status, and he states that he did not know he had it.  States he takes a green and a red pill, but only knows they are for infection.    Medical: History of right knee injury in the past, cholecystectomy, tonsillectomy.  HIV and hepatitis B.  Allergic to penicillin.    Available medical/psychiatric " "records reviewed and incorporated into the current document.     PAST PSYCHIATRIC HX: Patient denies any past psychiatric history.    SUBSTANCE USE HX: Denies any history of alcohol or illicit drug abuse.  Smokes cigarettes 1 pack a day for 25 years.  UDS is negative.       FAMILY HX: Maternal uncle had bipolar disorder.  No suicides among first-degree relatives.    SOCIAL HX: Patient was born in [place], [place].  States his father was in the [employer] and he was raised by his mother [place] [employer] [place] [employer].  He describes himself as a \"nerd that always had his nose in a book\".  States he has 2 bachelor's degrees and a masters degree in international relations.  First states that he is single, but later states he  a woman while he was in [place].  States he moved to [place], [place] 9 years ago.  Denies any legal issues.  Patient does not have any children, and no history of  service.    Past Medical History:   Diagnosis Date   • Dementia (CMS/HCC)    • Encephalopathy    • Headache    • Hepatitis B    • HIV (human immunodeficiency virus infection) (CMS/HCC)    • Infectious viral hepatitis    • Renal mass    • Tobacco abuse        Past Surgical History:   Procedure Laterality Date   • CHOLECYSTECTOMY  2013   • KNEE SURGERY Right 1988    [place]   • TONSILLECTOMY         Family History   Problem Relation Age of Onset   • Heart disease Mother    • Hypertension Father    • Asthma Father    • COPD Father    • Cancer Paternal Uncle    • Heart disease Maternal Grandmother    • Macular degeneration Maternal Grandmother    • Heart disease Maternal Grandfather    • Macular degeneration Maternal Grandfather          Medications Prior to Admission   Medication Sig Dispense Refill Last Dose   • darunavir ethanolate (PREZISTA) 800 MG tablet tablet Take 800 mg by mouth Daily With Breakfast.   Taking   • Fcpqypo-Qnqnd-Vshtyhxx-TenofAF (GENVOYA) [phone] MG tablet Take  by mouth.   Taking   • levoFLOXacin " (LEVAQUIN) 500 MG tablet Take 500 mg by mouth Daily.   Not Taking   • Multiple Vitamin (MULTI-DAY VITAMINS PO) Take  by mouth.   Taking           ALLERGIES:  Amoxicillin and Penicillins    Temp:  [97.3 °F (36.3 °C)-99.2 °F (37.3 °C)] 99.2 °F (37.3 °C)  Heart Rate:  [72-84] 84  Resp:  [18] 18  BP: (105-142)/(22-80) 137/22    REVIEW OF SYSTEMS:  Review of Systems   Constitutional: Negative.    HENT: Negative.    Eyes: Negative.    Respiratory: Negative.    Cardiovascular: Negative.    Gastrointestinal: Negative.    Endocrine: Negative.    Genitourinary: Negative.    Musculoskeletal: Negative.    Skin: Negative.    Allergic/Immunologic: Negative.    Neurological: Negative.    Hematological: Negative.    Psychiatric/Behavioral: Positive for behavioral problems and confusion. The patient is nervous/anxious.       See HPI for psychiatric ROS  OBJECTIVE    PHYSICAL EXAM:  Physical Exam  Constitutional: disheveled and poor personal hygiene  HENT:   Head: Normocephalic and atraumatic.   Right Ear: External ear normal.   Left Ear: External ear normal.   Mouth/Throat: Oropharynx is clear and moist.   Eyes: Pupils are equal, round, and reactive to light. Conjunctivae and EOM are normal.   Neck: Normal range of motion. Neck supple.   Cardiovascular: Normal rate, regular rhythm and normal heart sounds.    Pulmonary/Chest: Effort normal and breath sounds normal. No respiratory distress. No wheezes.   Abdominal: Soft. Bowel sounds are normal.No distension. There is no tenderness.   Musculoskeletal: Normal range of motion. No edema or deformity.   Neurological:Alert and oriented to person, place, and time. No cranial nerve deficit. Coordination normal.   Skin: Skin is warm and dry. No rash noted. No erythema.     MENTAL STATUS EXAM:               Hygiene:   poor  Cooperation:  Cooperative  Eye Contact:  Fair  Psychomotor Behavior:  Appropriate  Affect:  Blunted  Hopelessness: Denies  Speech:  Pressured, Rambling and Mumbled  Thought  Progress:  Tangential  Thought Content:  Bizarre  Suicidal:  None  Homicidal:  None  Hallucinations:  None  Delusion:  None  Memory:  Intact  Orientation:  Person, Place and Time  Reliability:  fair  Insight:  Poor  Judgement:  Poor  Impulse Control:  Poor      Imaging Results (Last 24 Hours)     ** No results found for the last 24 hours. **           ECG/EMG Results (most recent)     Procedure Component Value Units Date/Time    ECG 12 Lead [[id number]] Collected:  01/08/20 0428     Updated:  01/08/20 0433    Narrative:       Test Reason : Baseline Cardiac Status  Blood Pressure : **/** mmHG  Vent. Rate : 078 BPM     Atrial Rate : 078 BPM     P-R Int : 180 ms          QRS Dur : 080 ms      QT Int : 392 ms       P-R-T Axes : 078 003 063 degrees     QTc Int : 446 ms    Normal sinus rhythm  Possible Left atrial enlargement  Borderline ECG  No previous ECGs available    Referred By:  JEFF           Confirmed By:            Lab Results   Component Value Date    GLUCOSE 68 01/07/2020    BUN 24 (H) 01/07/2020    CREATININE 1.03 01/07/2020    EGFRIFNONA 74 01/07/2020    BCR 23.3 01/07/2020    CO2 30.6 (H) 01/07/2020    CALCIUM 9.7 01/07/2020    ALBUMIN 4.20 01/07/2020    AST 19 01/07/2020    ALT 16 01/07/2020       Lab Results   Component Value Date    WBC 6.52 01/07/2020    HGB 15.6 01/07/2020    HCT 45.8 01/07/2020    .6 (H) 01/07/2020     01/07/2020       Pain Management Panel     Pain Management Panel Latest Ref Rng & Units 1/7/2020    AMPHETAMINES SCREEN, URINE Negative Negative    BARBITURATES SCREEN Negative Negative    BENZODIAZEPINE SCREEN, URINE Negative Negative    BUPRENORPHINEUR Negative Negative    COCAINE SCREEN, URINE Negative Negative    METHADONE SCREEN, URINE Negative Negative          Brief Urine Lab Results  (Last result in the past 365 days)      Color   Clarity   Blood   Leuk Est   Nitrite   Protein   CREAT   Urine HCG        01/07/20 1643 Yellow Cloudy Small (1+) Large (3+) Positive  Negative               DATA  Labs reviewed  EKG reviewed  MILAD not done  Records reviewed. The patient has a history of HIV and dementia and has been in treatment for HIV at the [facility].      ASSESSMENT & PLAN:        Psychosis (CMS/HCC)  - Appears to be due to underlying dementia/mild cognitive impairment, will continue to monitor and may start on atypical antipsychotic if patient doesn't improve with supportive care.      HIV (human immunodeficiency virus infection) (CMS/HCC)  - Resume home medications      Tobacco use disorder  - Nicoderm patch      UTI (urinary tract infection)  - Macrobid        The patient has been admitted for safety and stabilization.  Patient will be monitored for suicidality daily and maintained on Sucide precaution Level 3 (q15 min checks) . The patient will have individual and group therapy with a master's level therapist. A master treatment plan will be developed and agreed upon by the patient and his/her treatment team.  The patient's estimated length of stay in the hospital is 5-7 days.       Written by Hossein Fish RN, acting as scribe for Dr. Wiggins. 's signature on this note affirms that the note adequately documents the care provided.     Winsome Fish RN  20  10:35 AM    IOziel MD, personally performed the services described in this documentation as scribed by the above named individual in my presence, and it is both accurate and complete.        Electronically signed by Oziel Wiggins MD at 20 1134          Discharge Summary      Oziel Wiggins MD at 20 0844          PSYCHIATRIC DISCHARGE SUMMARY     Patient Identification:  Name:  Noe Feliciano  Age:  59 y.o.  Sex:  male  :  1960  MRN:  [id number]  Visit Number:  [id number]      Date of Admission:2020   Date of Discharge:  2020    Discharge Diagnosis:  Principal Problem:    Psychosis (CMS/HCC)  Active Problems:    HIV (human immunodeficiency virus infection) (CMS/HCC)     "Tobacco use disorder    UTI (urinary tract infection)        Admission Diagnosis:  Psychosis (CMS/[facility]) [F29]     Hospital Course  Patient is a 59 y.o. male presented with confusion, delusional and bizarre behaviors and was not redirectable. The patient was admitted to the [facility] AE1 unit for safety, further evaluation and treatment.  The patient had rapid speech with flight of ideas but was sleeping good. He appeared confused at times had poor judgment and acted in bizarre manners. 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 He was started on Abilify at first to help with psychosis but it didn't make much of difference in his behaviors or thought processes and it was not continued at discharge.  The patient was started on Aricept for cognitive deficits.  The patient has a state guardian and referral were sent for nursing home placement but the patient was not accepted to any of the nursing homes and his guardian agreed for him to go to a personal care home.  The day of discharge the patient was calm, cooperative and pleasant. Mood was reported to be good, and denied SI/HI/AVH. Also reported no medication side effects.        Mental Status Exam upon discharge:   Mood \"good\"   Affect-congruent, appropriate, stable  Thought Content-delusional at times  Thought process-tangential, disorganized.  Suicidality: No SI  Homicidality: No HI  Perception: No AH/VH    Procedures Performed         Consults:   Consults     No orders found from 12/9/2019 to 1/8/2020.          Pertinent Test Results:   Lab Results (last 7 days)     ** No results found for the last 168 hours. **          Condition on Discharge:  guarded    Vital Signs  Temp:  [98 °F (36.7 °C)-98.8 °F (37.1 °C)] 98 °F (36.7 °C)  Heart Rate:  [84-90] 90  Resp:  [16-18] 16  BP: (108-109)/(66-67) 109/67      Discharge Disposition:  Home or Self Care    Discharge Medications:     Discharge Medications      New Medications      Instructions Start Date   donepezil " 5 MG tablet  Commonly known as:  ARICEPT   5 mg, Oral, Nightly         Continue These Medications      Instructions Start Date   GENVOYA [phone] MG per tablet  Generic drug:  Lnojuyq-Wrktm-Abtjaogd-TenofAF   1 tablet, Oral, Daily      multivitamin tablet tablet   1 tablet, Oral, Daily      PREZISTA 800 MG tablet tablet  Generic drug:  darunavir ethanolate   800 mg, Oral, Daily With Breakfast             Discharge Diet: Regular    Activity at Discharge: As tolerated    Follow-up Appointments  Future Appointments   Date Time Provider Department Center   4/10/2020 11:00 AM Shakeel Wagoner MD [facility]     Test Results Pending at Discharge      Clinician:   Oziel Wiggins MD  01/17/20  8:24 AM    Electronically signed by Oziel Wiggins MD at 01/17/20 0830

## 2020-01-17 NOTE — PROGRESS NOTES
[facility] scheduled for 11:30am .     RTEC: Patient is being discharged on this day.    Completed [facility] on this day for Meds and Transportation. Approved by [employer]. Approved by Nikita ORDONEZ

## 2020-01-17 NOTE — DISCHARGE SUMMARY
"PSYCHIATRIC DISCHARGE SUMMARY     Patient Identification:  Name:  Noe Feliciano  Age:  59 y.o.  Sex:  male  :  1960  MRN:  [id number]  Visit Number:  [id number]      Date of Admission:2020   Date of Discharge:  2020    Discharge Diagnosis:  Principal Problem:    Psychosis (CMS/[facility])  Active Problems:    HIV (human immunodeficiency virus infection) (CMS/[facility])    Tobacco use disorder    UTI (urinary tract infection)        Admission Diagnosis:  Psychosis (CMS/[facility]) [F29]     Hospital Course  Patient is a 59 y.o. male presented with confusion, delusional and bizarre behaviors and was not redirectable. The patient was admitted to the [facility] AE1 unit for safety, further evaluation and treatment.  The patient had rapid speech with flight of ideas but was sleeping good. He appeared confused at times had poor judgment and acted in bizarre manners. 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 He was started on Abilify at first to help with psychosis but it didn't make much of difference in his behaviors or thought processes and it was not continued at discharge.  The patient was started on Aricept for cognitive deficits.  The patient has a state guardian and referral were sent for nursing home placement but the patient was not accepted to any of the nursing homes and his guardian agreed for him to go to a personal care home.  The day of discharge the patient was calm, cooperative and pleasant. Mood was reported to be good, and denied SI/HI/AVH. Also reported no medication side effects.        Mental Status Exam upon discharge:   Mood \"good\"   Affect-congruent, appropriate, stable  Thought Content-delusional at times  Thought process-tangential, disorganized.  Suicidality: No SI  Homicidality: No HI  Perception: No AH/VH    Procedures Performed         Consults:   Consults     No orders found from 2019 to 2020.          Pertinent Test Results:   Lab Results (last 7 days)     ** No results " found for the last 168 hours. **          Condition on Discharge:  guarded    Vital Signs  Temp:  [98 °F (36.7 °C)-98.8 °F (37.1 °C)] 98 °F (36.7 °C)  Heart Rate:  [84-90] 90  Resp:  [16-18] 16  BP: (108-109)/(66-67) 109/67      Discharge Disposition:  Home or Self Care    Discharge Medications:     Discharge Medications      New Medications      Instructions Start Date   donepezil 5 MG tablet  Commonly known as:  ARICEPT   5 mg, Oral, Nightly         Continue These Medications      Instructions Start Date   GENVOYA [phone] MG per tablet  Generic drug:  Xxavkea-Yzcbw-Rablcvzx-TenofAF   1 tablet, Oral, Daily      multivitamin tablet tablet   1 tablet, Oral, Daily      PREZISTA 800 MG tablet tablet  Generic drug:  darunavir ethanolate   800 mg, Oral, Daily With Breakfast             Discharge Diet: Regular    Activity at Discharge: As tolerated    Follow-up Appointments  Future Appointments   Date Time Provider Department Center   4/10/2020 11:00 AM Shakeel Wagoner MD [facility] None      [facility]     Test Results Pending at Discharge      Clinician:   Oziel Wiggins MD  01/17/20  8:24 AM